# Patient Record
Sex: FEMALE | Race: WHITE | NOT HISPANIC OR LATINO
[De-identification: names, ages, dates, MRNs, and addresses within clinical notes are randomized per-mention and may not be internally consistent; named-entity substitution may affect disease eponyms.]

---

## 2017-01-31 ENCOUNTER — APPOINTMENT (OUTPATIENT)
Dept: GYNECOLOGIC ONCOLOGY | Facility: CLINIC | Age: 72
End: 2017-01-31

## 2017-01-31 VITALS
SYSTOLIC BLOOD PRESSURE: 122 MMHG | BODY MASS INDEX: 28.68 KG/M2 | WEIGHT: 168 LBS | DIASTOLIC BLOOD PRESSURE: 76 MMHG | HEIGHT: 64 IN

## 2017-04-26 ENCOUNTER — OUTPATIENT (OUTPATIENT)
Dept: OUTPATIENT SERVICES | Facility: HOSPITAL | Age: 72
LOS: 1 days | Discharge: HOME | End: 2017-04-26

## 2017-04-26 DIAGNOSIS — Z90.710 ACQUIRED ABSENCE OF BOTH CERVIX AND UTERUS: Chronic | ICD-10-CM

## 2017-04-26 DIAGNOSIS — N75.0 CYST OF BARTHOLIN'S GLAND: Chronic | ICD-10-CM

## 2017-04-26 DIAGNOSIS — Z98.89 OTHER SPECIFIED POSTPROCEDURAL STATES: Chronic | ICD-10-CM

## 2017-04-26 DIAGNOSIS — C57.00 MALIGNANT NEOPLASM OF UNSPECIFIED FALLOPIAN TUBE: ICD-10-CM

## 2017-04-30 ENCOUNTER — RESULT REVIEW (OUTPATIENT)
Age: 72
End: 2017-04-30

## 2017-04-30 ENCOUNTER — FORM ENCOUNTER (OUTPATIENT)
Age: 72
End: 2017-04-30

## 2017-05-01 ENCOUNTER — OUTPATIENT (OUTPATIENT)
Dept: OUTPATIENT SERVICES | Facility: HOSPITAL | Age: 72
LOS: 1 days | End: 2017-05-01
Payer: MEDICARE

## 2017-05-01 ENCOUNTER — APPOINTMENT (OUTPATIENT)
Dept: HEMATOLOGY ONCOLOGY | Facility: CLINIC | Age: 72
End: 2017-05-01

## 2017-05-01 ENCOUNTER — APPOINTMENT (OUTPATIENT)
Dept: RADIOLOGY | Facility: IMAGING CENTER | Age: 72
End: 2017-05-01

## 2017-05-01 VITALS
WEIGHT: 174.17 LBS | DIASTOLIC BLOOD PRESSURE: 76 MMHG | OXYGEN SATURATION: 97 % | HEART RATE: 70 BPM | BODY MASS INDEX: 29.9 KG/M2 | SYSTOLIC BLOOD PRESSURE: 110 MMHG | TEMPERATURE: 98.4 F | RESPIRATION RATE: 16 BRPM

## 2017-05-01 DIAGNOSIS — G62.0 DRUG-INDUCED POLYNEUROPATHY: ICD-10-CM

## 2017-05-01 DIAGNOSIS — Z98.89 OTHER SPECIFIED POSTPROCEDURAL STATES: Chronic | ICD-10-CM

## 2017-05-01 DIAGNOSIS — Z00.8 ENCOUNTER FOR OTHER GENERAL EXAMINATION: ICD-10-CM

## 2017-05-01 DIAGNOSIS — Z90.710 ACQUIRED ABSENCE OF BOTH CERVIX AND UTERUS: Chronic | ICD-10-CM

## 2017-05-01 DIAGNOSIS — N75.0 CYST OF BARTHOLIN'S GLAND: Chronic | ICD-10-CM

## 2017-05-01 PROCEDURE — 70330 X-RAY EXAM OF JAW JOINTS: CPT | Mod: 26

## 2017-05-01 PROCEDURE — 70330 X-RAY EXAM OF JAW JOINTS: CPT

## 2017-05-02 LAB
ALBUMIN SERPL ELPH-MCNC: 4.6 G/DL
ALP BLD-CCNC: 67 U/L
ALT SERPL-CCNC: 13 U/L
AST SERPL-CCNC: 13 U/L
BILIRUB SERPL-MCNC: 0.4 MG/DL
BUN SERPL-MCNC: 20 MG/DL
CALCIUM SERPL-MCNC: 10.3 MG/DL
CANCER AG125 SERPL-ACNC: 4 U/ML
CHLORIDE SERPL-SCNC: 101 MMOL/L
CO2 SERPL-SCNC: 28 MMOL/L
CREAT SERPL-MCNC: 1.19 MG/DL
GLUCOSE SERPL-MCNC: 81 MG/DL
POTASSIUM SERPL-SCNC: 4.7 MMOL/L
PROT SERPL-MCNC: 7.3 G/DL
SODIUM SERPL-SCNC: 144 MMOL/L

## 2017-05-12 ENCOUNTER — FORM ENCOUNTER (OUTPATIENT)
Age: 72
End: 2017-05-12

## 2017-05-13 ENCOUNTER — APPOINTMENT (OUTPATIENT)
Dept: CT IMAGING | Facility: IMAGING CENTER | Age: 72
End: 2017-05-13

## 2017-05-13 ENCOUNTER — OUTPATIENT (OUTPATIENT)
Dept: OUTPATIENT SERVICES | Facility: HOSPITAL | Age: 72
LOS: 1 days | End: 2017-05-13
Payer: MEDICARE

## 2017-05-13 DIAGNOSIS — Z98.89 OTHER SPECIFIED POSTPROCEDURAL STATES: Chronic | ICD-10-CM

## 2017-05-13 DIAGNOSIS — N75.0 CYST OF BARTHOLIN'S GLAND: Chronic | ICD-10-CM

## 2017-05-13 DIAGNOSIS — Z90.710 ACQUIRED ABSENCE OF BOTH CERVIX AND UTERUS: Chronic | ICD-10-CM

## 2017-05-13 DIAGNOSIS — C57.00 MALIGNANT NEOPLASM OF UNSPECIFIED FALLOPIAN TUBE: ICD-10-CM

## 2017-05-13 PROCEDURE — 71260 CT THORAX DX C+: CPT

## 2017-05-13 PROCEDURE — 82565 ASSAY OF CREATININE: CPT

## 2017-05-13 PROCEDURE — 74177 CT ABD & PELVIS W/CONTRAST: CPT

## 2017-07-25 ENCOUNTER — APPOINTMENT (OUTPATIENT)
Dept: GYNECOLOGIC ONCOLOGY | Facility: CLINIC | Age: 72
End: 2017-07-25
Payer: MEDICARE

## 2017-07-25 VITALS
HEIGHT: 64 IN | SYSTOLIC BLOOD PRESSURE: 110 MMHG | BODY MASS INDEX: 29.37 KG/M2 | DIASTOLIC BLOOD PRESSURE: 74 MMHG | WEIGHT: 172 LBS

## 2017-07-25 PROCEDURE — 99213 OFFICE O/P EST LOW 20 MIN: CPT

## 2017-07-25 RX ORDER — MOMETASONE FUROATE 1 MG/ML
0.1 SOLUTION TOPICAL
Qty: 30 | Refills: 0 | Status: COMPLETED | COMMUNITY
Start: 2017-04-03

## 2017-07-25 RX ORDER — NEOMYCIN AND POLYMYXIN B SULFATES AND HYDROCORTISONE OTIC 10; 3.5; 1 MG/ML; MG/ML; [USP'U]/ML
3.5-10000-1 SUSPENSION AURICULAR (OTIC)
Qty: 10 | Refills: 0 | Status: COMPLETED | COMMUNITY
Start: 2017-04-03

## 2017-07-31 ENCOUNTER — OUTPATIENT (OUTPATIENT)
Dept: OUTPATIENT SERVICES | Facility: HOSPITAL | Age: 72
LOS: 1 days | Discharge: HOME | End: 2017-07-31

## 2017-07-31 DIAGNOSIS — Z90.710 ACQUIRED ABSENCE OF BOTH CERVIX AND UTERUS: Chronic | ICD-10-CM

## 2017-07-31 DIAGNOSIS — N75.0 CYST OF BARTHOLIN'S GLAND: Chronic | ICD-10-CM

## 2017-07-31 DIAGNOSIS — C57.00 MALIGNANT NEOPLASM OF UNSPECIFIED FALLOPIAN TUBE: ICD-10-CM

## 2017-07-31 DIAGNOSIS — Z98.89 OTHER SPECIFIED POSTPROCEDURAL STATES: Chronic | ICD-10-CM

## 2017-08-04 ENCOUNTER — APPOINTMENT (OUTPATIENT)
Dept: HEMATOLOGY ONCOLOGY | Facility: CLINIC | Age: 72
End: 2017-08-04
Payer: MEDICARE

## 2017-08-04 ENCOUNTER — RESULT REVIEW (OUTPATIENT)
Age: 72
End: 2017-08-04

## 2017-08-04 VITALS
TEMPERATURE: 98.5 F | HEART RATE: 71 BPM | RESPIRATION RATE: 16 BRPM | OXYGEN SATURATION: 97 % | BODY MASS INDEX: 29.52 KG/M2 | SYSTOLIC BLOOD PRESSURE: 126 MMHG | WEIGHT: 172 LBS | DIASTOLIC BLOOD PRESSURE: 74 MMHG

## 2017-08-04 LAB
HCT VFR BLD CALC: 42.9 % — SIGNIFICANT CHANGE UP (ref 34.5–45)
HGB BLD-MCNC: 13.9 G/DL — SIGNIFICANT CHANGE UP (ref 11.5–15.5)
MCHC RBC-ENTMCNC: 30.8 PG — SIGNIFICANT CHANGE UP (ref 27–34)
MCHC RBC-ENTMCNC: 32.4 G/DL — SIGNIFICANT CHANGE UP (ref 32–36)
MCV RBC AUTO: 94.9 FL — SIGNIFICANT CHANGE UP (ref 80–100)
PLATELET # BLD AUTO: 220 K/UL — SIGNIFICANT CHANGE UP (ref 150–400)
RBC # BLD: 4.52 M/UL — SIGNIFICANT CHANGE UP (ref 3.8–5.2)
RBC # FLD: 13.2 % — SIGNIFICANT CHANGE UP (ref 10.3–14.5)
WBC # BLD: 6.5 K/UL — SIGNIFICANT CHANGE UP (ref 3.8–10.5)
WBC # FLD AUTO: 6.5 K/UL — SIGNIFICANT CHANGE UP (ref 3.8–10.5)

## 2017-08-04 PROCEDURE — 99214 OFFICE O/P EST MOD 30 MIN: CPT

## 2017-08-04 RX ORDER — DOXYCYCLINE HYCLATE 100 MG/1
100 CAPSULE ORAL
Qty: 20 | Refills: 0 | Status: DISCONTINUED | COMMUNITY
Start: 2017-07-11

## 2017-08-04 RX ORDER — LEVOTHYROXINE SODIUM 75 UG/1
75 TABLET ORAL
Refills: 0 | Status: ACTIVE | COMMUNITY

## 2017-08-04 RX ORDER — 1.1% SODIUM FLUORIDE PRESCRIPTION DENTAL CREAM 5 MG/G
1.1 CREAM DENTAL
Qty: 51 | Refills: 0 | Status: DISCONTINUED | COMMUNITY
Start: 2017-07-13

## 2017-08-05 LAB
ALBUMIN SERPL ELPH-MCNC: 4.6 G/DL
ALP BLD-CCNC: 67 U/L
ALT SERPL-CCNC: 13 U/L
ANION GAP SERPL CALC-SCNC: 16 MMOL/L
AST SERPL-CCNC: 15 U/L
BILIRUB SERPL-MCNC: 0.3 MG/DL
BUN SERPL-MCNC: 19 MG/DL
CALCIUM SERPL-MCNC: 10.4 MG/DL
CANCER AG125 SERPL-ACNC: 5 U/ML
CHLORIDE SERPL-SCNC: 102 MMOL/L
CO2 SERPL-SCNC: 27 MMOL/L
CREAT SERPL-MCNC: 1.15 MG/DL
GLUCOSE SERPL-MCNC: 95 MG/DL
POTASSIUM SERPL-SCNC: 4.7 MMOL/L
PROT SERPL-MCNC: 7.3 G/DL
SODIUM SERPL-SCNC: 145 MMOL/L

## 2017-11-17 ENCOUNTER — FORM ENCOUNTER (OUTPATIENT)
Age: 72
End: 2017-11-17

## 2017-11-18 ENCOUNTER — OUTPATIENT (OUTPATIENT)
Dept: OUTPATIENT SERVICES | Facility: HOSPITAL | Age: 72
LOS: 1 days | End: 2017-11-18
Payer: MEDICARE

## 2017-11-18 ENCOUNTER — APPOINTMENT (OUTPATIENT)
Dept: CT IMAGING | Facility: IMAGING CENTER | Age: 72
End: 2017-11-18
Payer: MEDICARE

## 2017-11-18 DIAGNOSIS — N75.0 CYST OF BARTHOLIN'S GLAND: Chronic | ICD-10-CM

## 2017-11-18 DIAGNOSIS — Z90.710 ACQUIRED ABSENCE OF BOTH CERVIX AND UTERUS: Chronic | ICD-10-CM

## 2017-11-18 DIAGNOSIS — Z98.89 OTHER SPECIFIED POSTPROCEDURAL STATES: Chronic | ICD-10-CM

## 2017-11-18 DIAGNOSIS — Z00.8 ENCOUNTER FOR OTHER GENERAL EXAMINATION: ICD-10-CM

## 2017-11-18 PROCEDURE — 71260 CT THORAX DX C+: CPT

## 2017-11-18 PROCEDURE — 74177 CT ABD & PELVIS W/CONTRAST: CPT

## 2017-11-18 PROCEDURE — 71260 CT THORAX DX C+: CPT | Mod: 26

## 2017-11-18 PROCEDURE — 74177 CT ABD & PELVIS W/CONTRAST: CPT | Mod: 26

## 2017-11-18 PROCEDURE — 82565 ASSAY OF CREATININE: CPT

## 2018-03-02 ENCOUNTER — OUTPATIENT (OUTPATIENT)
Dept: OUTPATIENT SERVICES | Facility: HOSPITAL | Age: 73
LOS: 1 days | Discharge: HOME | End: 2018-03-02

## 2018-03-02 DIAGNOSIS — C57.00 MALIGNANT NEOPLASM OF UNSPECIFIED FALLOPIAN TUBE: ICD-10-CM

## 2018-03-02 DIAGNOSIS — N75.0 CYST OF BARTHOLIN'S GLAND: Chronic | ICD-10-CM

## 2018-03-02 DIAGNOSIS — Z90.710 ACQUIRED ABSENCE OF BOTH CERVIX AND UTERUS: Chronic | ICD-10-CM

## 2018-03-02 DIAGNOSIS — Z98.89 OTHER SPECIFIED POSTPROCEDURAL STATES: Chronic | ICD-10-CM

## 2018-03-09 ENCOUNTER — APPOINTMENT (OUTPATIENT)
Dept: HEMATOLOGY ONCOLOGY | Facility: CLINIC | Age: 73
End: 2018-03-09
Payer: MEDICARE

## 2018-03-09 ENCOUNTER — RESULT REVIEW (OUTPATIENT)
Age: 73
End: 2018-03-09

## 2018-03-09 VITALS
WEIGHT: 174.81 LBS | OXYGEN SATURATION: 96 % | SYSTOLIC BLOOD PRESSURE: 107 MMHG | TEMPERATURE: 98.2 F | DIASTOLIC BLOOD PRESSURE: 66 MMHG | BODY MASS INDEX: 30.01 KG/M2 | HEART RATE: 65 BPM | RESPIRATION RATE: 16 BRPM

## 2018-03-09 LAB
HCT VFR BLD CALC: 41.9 % — SIGNIFICANT CHANGE UP (ref 34.5–45)
HGB BLD-MCNC: 14.3 G/DL — SIGNIFICANT CHANGE UP (ref 11.5–15.5)
MCHC RBC-ENTMCNC: 32.3 PG — SIGNIFICANT CHANGE UP (ref 27–34)
MCHC RBC-ENTMCNC: 34.2 G/DL — SIGNIFICANT CHANGE UP (ref 32–36)
MCV RBC AUTO: 94.4 FL — SIGNIFICANT CHANGE UP (ref 80–100)
PLATELET # BLD AUTO: 204 K/UL — SIGNIFICANT CHANGE UP (ref 150–400)
RBC # BLD: 4.44 M/UL — SIGNIFICANT CHANGE UP (ref 3.8–5.2)
RBC # FLD: 11.5 % — SIGNIFICANT CHANGE UP (ref 10.3–14.5)
WBC # BLD: 5.5 K/UL — SIGNIFICANT CHANGE UP (ref 3.8–10.5)
WBC # FLD AUTO: 5.5 K/UL — SIGNIFICANT CHANGE UP (ref 3.8–10.5)

## 2018-03-09 PROCEDURE — 99214 OFFICE O/P EST MOD 30 MIN: CPT

## 2018-03-11 LAB
ALBUMIN SERPL ELPH-MCNC: 4.4 G/DL
ALP BLD-CCNC: 69 U/L
ALT SERPL-CCNC: 13 U/L
ANION GAP SERPL CALC-SCNC: 16 MMOL/L
AST SERPL-CCNC: 16 U/L
BILIRUB SERPL-MCNC: 0.3 MG/DL
BUN SERPL-MCNC: 22 MG/DL
CALCIUM SERPL-MCNC: 10.1 MG/DL
CANCER AG125 SERPL-ACNC: 4 U/ML
CHLORIDE SERPL-SCNC: 102 MMOL/L
CO2 SERPL-SCNC: 24 MMOL/L
CREAT SERPL-MCNC: 1.18 MG/DL
GLUCOSE SERPL-MCNC: 84 MG/DL
POTASSIUM SERPL-SCNC: 4.9 MMOL/L
PROT SERPL-MCNC: 7 G/DL
SODIUM SERPL-SCNC: 142 MMOL/L

## 2018-04-20 ENCOUNTER — FORM ENCOUNTER (OUTPATIENT)
Age: 73
End: 2018-04-20

## 2018-04-21 ENCOUNTER — APPOINTMENT (OUTPATIENT)
Dept: CT IMAGING | Facility: IMAGING CENTER | Age: 73
End: 2018-04-21
Payer: MEDICARE

## 2018-04-21 ENCOUNTER — OUTPATIENT (OUTPATIENT)
Dept: OUTPATIENT SERVICES | Facility: HOSPITAL | Age: 73
LOS: 1 days | End: 2018-04-21
Payer: MEDICARE

## 2018-04-21 DIAGNOSIS — Z98.89 OTHER SPECIFIED POSTPROCEDURAL STATES: Chronic | ICD-10-CM

## 2018-04-21 DIAGNOSIS — Z90.710 ACQUIRED ABSENCE OF BOTH CERVIX AND UTERUS: Chronic | ICD-10-CM

## 2018-04-21 DIAGNOSIS — N75.0 CYST OF BARTHOLIN'S GLAND: Chronic | ICD-10-CM

## 2018-04-21 DIAGNOSIS — C57.00 MALIGNANT NEOPLASM OF UNSPECIFIED FALLOPIAN TUBE: ICD-10-CM

## 2018-04-21 PROCEDURE — 74177 CT ABD & PELVIS W/CONTRAST: CPT

## 2018-04-21 PROCEDURE — 82565 ASSAY OF CREATININE: CPT

## 2018-04-21 PROCEDURE — 74177 CT ABD & PELVIS W/CONTRAST: CPT | Mod: 26

## 2018-04-21 PROCEDURE — 71260 CT THORAX DX C+: CPT | Mod: 26

## 2018-04-21 PROCEDURE — 71260 CT THORAX DX C+: CPT

## 2018-05-08 ENCOUNTER — APPOINTMENT (OUTPATIENT)
Dept: GYNECOLOGIC ONCOLOGY | Facility: CLINIC | Age: 73
End: 2018-05-08
Payer: MEDICARE

## 2018-05-08 VITALS
DIASTOLIC BLOOD PRESSURE: 72 MMHG | HEIGHT: 64 IN | WEIGHT: 175 LBS | SYSTOLIC BLOOD PRESSURE: 110 MMHG | BODY MASS INDEX: 29.88 KG/M2

## 2018-05-08 DIAGNOSIS — Z87.898 PERSONAL HISTORY OF OTHER SPECIFIED CONDITIONS: ICD-10-CM

## 2018-05-08 PROCEDURE — 99214 OFFICE O/P EST MOD 30 MIN: CPT

## 2018-05-08 RX ORDER — MONTELUKAST 10 MG/1
TABLET, FILM COATED ORAL
Refills: 0 | Status: ACTIVE | COMMUNITY

## 2018-05-08 RX ORDER — MUPIROCIN 20 MG/G
2 OINTMENT TOPICAL
Qty: 22 | Refills: 0 | Status: DISCONTINUED | COMMUNITY
Start: 2017-08-01 | End: 2018-05-08

## 2018-05-08 RX ORDER — AZELASTINE HYDROCHLORIDE 137 UG/1
0.1 SPRAY, METERED NASAL
Qty: 30 | Refills: 0 | Status: DISCONTINUED | COMMUNITY
Start: 2017-06-02 | End: 2018-05-08

## 2018-09-20 ENCOUNTER — OUTPATIENT (OUTPATIENT)
Dept: OUTPATIENT SERVICES | Facility: HOSPITAL | Age: 73
LOS: 1 days | Discharge: HOME | End: 2018-09-20

## 2018-09-20 DIAGNOSIS — Z98.89 OTHER SPECIFIED POSTPROCEDURAL STATES: Chronic | ICD-10-CM

## 2018-09-20 DIAGNOSIS — N75.0 CYST OF BARTHOLIN'S GLAND: Chronic | ICD-10-CM

## 2018-09-20 DIAGNOSIS — Z90.710 ACQUIRED ABSENCE OF BOTH CERVIX AND UTERUS: Chronic | ICD-10-CM

## 2018-09-20 DIAGNOSIS — C57.00 MALIGNANT NEOPLASM OF UNSPECIFIED FALLOPIAN TUBE: ICD-10-CM

## 2018-09-28 ENCOUNTER — APPOINTMENT (OUTPATIENT)
Dept: HEMATOLOGY ONCOLOGY | Facility: CLINIC | Age: 73
End: 2018-09-28
Payer: MEDICARE

## 2018-09-28 ENCOUNTER — RESULT REVIEW (OUTPATIENT)
Age: 73
End: 2018-09-28

## 2018-09-28 VITALS
WEIGHT: 176 LBS | OXYGEN SATURATION: 96 % | HEART RATE: 77 BPM | TEMPERATURE: 98.1 F | DIASTOLIC BLOOD PRESSURE: 78 MMHG | RESPIRATION RATE: 16 BRPM | BODY MASS INDEX: 30.21 KG/M2 | SYSTOLIC BLOOD PRESSURE: 126 MMHG

## 2018-09-28 LAB
HCT VFR BLD CALC: 42.5 % — SIGNIFICANT CHANGE UP (ref 34.5–45)
HGB BLD-MCNC: 14.6 G/DL — SIGNIFICANT CHANGE UP (ref 11.5–15.5)
MCHC RBC-ENTMCNC: 32.6 PG — SIGNIFICANT CHANGE UP (ref 27–34)
MCHC RBC-ENTMCNC: 34.2 G/DL — SIGNIFICANT CHANGE UP (ref 32–36)
MCV RBC AUTO: 95.1 FL — SIGNIFICANT CHANGE UP (ref 80–100)
PLATELET # BLD AUTO: 229 K/UL — SIGNIFICANT CHANGE UP (ref 150–400)
RBC # BLD: 4.47 M/UL — SIGNIFICANT CHANGE UP (ref 3.8–5.2)
RBC # FLD: 11.7 % — SIGNIFICANT CHANGE UP (ref 10.3–14.5)
WBC # BLD: 5.4 K/UL — SIGNIFICANT CHANGE UP (ref 3.8–10.5)
WBC # FLD AUTO: 5.4 K/UL — SIGNIFICANT CHANGE UP (ref 3.8–10.5)

## 2018-09-28 PROCEDURE — 99214 OFFICE O/P EST MOD 30 MIN: CPT

## 2018-09-29 LAB — CANCER AG125 SERPL-ACNC: 6 U/ML

## 2018-10-19 ENCOUNTER — FORM ENCOUNTER (OUTPATIENT)
Age: 73
End: 2018-10-19

## 2018-10-20 ENCOUNTER — APPOINTMENT (OUTPATIENT)
Dept: CT IMAGING | Facility: IMAGING CENTER | Age: 73
End: 2018-10-20
Payer: MEDICARE

## 2018-10-20 ENCOUNTER — OUTPATIENT (OUTPATIENT)
Dept: OUTPATIENT SERVICES | Facility: HOSPITAL | Age: 73
LOS: 1 days | End: 2018-10-20
Payer: MEDICARE

## 2018-10-20 DIAGNOSIS — Z98.89 OTHER SPECIFIED POSTPROCEDURAL STATES: Chronic | ICD-10-CM

## 2018-10-20 DIAGNOSIS — Z90.710 ACQUIRED ABSENCE OF BOTH CERVIX AND UTERUS: Chronic | ICD-10-CM

## 2018-10-20 DIAGNOSIS — Z00.8 ENCOUNTER FOR OTHER GENERAL EXAMINATION: ICD-10-CM

## 2018-10-20 DIAGNOSIS — N75.0 CYST OF BARTHOLIN'S GLAND: Chronic | ICD-10-CM

## 2018-10-20 PROCEDURE — 71260 CT THORAX DX C+: CPT | Mod: 26

## 2018-10-20 PROCEDURE — 74177 CT ABD & PELVIS W/CONTRAST: CPT

## 2018-10-20 PROCEDURE — 74177 CT ABD & PELVIS W/CONTRAST: CPT | Mod: 26

## 2018-10-20 PROCEDURE — 71260 CT THORAX DX C+: CPT

## 2018-10-20 PROCEDURE — 82565 ASSAY OF CREATININE: CPT

## 2018-11-27 ENCOUNTER — APPOINTMENT (OUTPATIENT)
Dept: GYNECOLOGIC ONCOLOGY | Facility: CLINIC | Age: 73
End: 2018-11-27
Payer: MEDICARE

## 2018-11-27 VITALS — WEIGHT: 174 LBS | DIASTOLIC BLOOD PRESSURE: 80 MMHG | SYSTOLIC BLOOD PRESSURE: 130 MMHG | BODY MASS INDEX: 29.87 KG/M2

## 2018-11-27 PROCEDURE — 99213 OFFICE O/P EST LOW 20 MIN: CPT

## 2019-04-23 ENCOUNTER — OUTPATIENT (OUTPATIENT)
Dept: OUTPATIENT SERVICES | Facility: HOSPITAL | Age: 74
LOS: 1 days | Discharge: HOME | End: 2019-04-23

## 2019-04-23 DIAGNOSIS — Z98.89 OTHER SPECIFIED POSTPROCEDURAL STATES: Chronic | ICD-10-CM

## 2019-04-23 DIAGNOSIS — C57.00 MALIGNANT NEOPLASM OF UNSPECIFIED FALLOPIAN TUBE: ICD-10-CM

## 2019-04-23 DIAGNOSIS — Z90.710 ACQUIRED ABSENCE OF BOTH CERVIX AND UTERUS: Chronic | ICD-10-CM

## 2019-04-23 DIAGNOSIS — N75.0 CYST OF BARTHOLIN'S GLAND: Chronic | ICD-10-CM

## 2019-04-26 ENCOUNTER — RESULT REVIEW (OUTPATIENT)
Age: 74
End: 2019-04-26

## 2019-04-26 ENCOUNTER — APPOINTMENT (OUTPATIENT)
Dept: HEMATOLOGY ONCOLOGY | Facility: CLINIC | Age: 74
End: 2019-04-26
Payer: MEDICARE

## 2019-04-26 VITALS
BODY MASS INDEX: 29.97 KG/M2 | TEMPERATURE: 98.1 F | OXYGEN SATURATION: 96 % | WEIGHT: 174.6 LBS | DIASTOLIC BLOOD PRESSURE: 84 MMHG | SYSTOLIC BLOOD PRESSURE: 133 MMHG | HEART RATE: 67 BPM | RESPIRATION RATE: 16 BRPM

## 2019-04-26 DIAGNOSIS — R06.02 SHORTNESS OF BREATH: ICD-10-CM

## 2019-04-26 LAB
HCT VFR BLD CALC: 43.1 % — SIGNIFICANT CHANGE UP (ref 34.5–45)
HGB BLD-MCNC: 14.4 G/DL — SIGNIFICANT CHANGE UP (ref 11.5–15.5)
MCHC RBC-ENTMCNC: 31.7 PG — SIGNIFICANT CHANGE UP (ref 27–34)
MCHC RBC-ENTMCNC: 33.5 G/DL — SIGNIFICANT CHANGE UP (ref 32–36)
MCV RBC AUTO: 94.6 FL — SIGNIFICANT CHANGE UP (ref 80–100)
PLATELET # BLD AUTO: 251 K/UL — SIGNIFICANT CHANGE UP (ref 150–400)
RBC # BLD: 4.56 M/UL — SIGNIFICANT CHANGE UP (ref 3.8–5.2)
RBC # FLD: 11.7 % — SIGNIFICANT CHANGE UP (ref 10.3–14.5)
WBC # BLD: 6.4 K/UL — SIGNIFICANT CHANGE UP (ref 3.8–10.5)
WBC # FLD AUTO: 6.4 K/UL — SIGNIFICANT CHANGE UP (ref 3.8–10.5)

## 2019-04-26 PROCEDURE — 99214 OFFICE O/P EST MOD 30 MIN: CPT

## 2019-04-26 RX ORDER — OMEPRAZOLE 40 MG/1
CAPSULE, DELAYED RELEASE ORAL
Refills: 0 | Status: DISCONTINUED | COMMUNITY
End: 2019-04-26

## 2019-04-28 NOTE — PHYSICAL EXAM
[Fully active, able to carry on all pre-disease performance without restriction] : Status 0 - Fully active, able to carry on all pre-disease performance without restriction [Normal] : affect appropriate [de-identified] : opens and closes jaw without click or pain  [de-identified] : +BS, + tenderness on palpation around midline surgical scar; no rebound or guarding [de-identified] : gait steady

## 2019-04-28 NOTE — REASON FOR VISIT
[Follow-Up Visit] : a follow-up [Spouse] : spouse [FreeTextEntry2] : follow up for fallopian tube carcinoma

## 2019-04-28 NOTE — REVIEW OF SYSTEMS
[Negative] : Allergic/Immunologic [Shortness Of Breath] : shortness of breath [Wheezing] : no wheezing [Cough] : cough [SOB on Exertion] : no shortness of breath during exertion

## 2019-04-28 NOTE — CONSULT LETTER
[Dear  ___] : Dear  [unfilled], [Courtesy Letter:] : I had the pleasure of seeing your patient, [unfilled], in my office today. [Please see my note below.] : Please see my note below. [Consult Closing:] : Thank you very much for allowing me to participate in the care of this patient.  If you have any questions, please do not hesitate to contact me. [Sincerely,] : Sincerely, [FreeTextEntry2] : Karel Santana MD\par [id number] [place] [address] [address]\[place] [address] [FreeTextEntry3] : Andrzej Mortensen MD\par Attending\par [facility]\par

## 2019-04-28 NOTE — HISTORY OF PRESENT ILLNESS
[Disease: _____________________] : Disease: [unfilled] [T: ___] : T[unfilled] [AJCC Stage: ____] : AJCC Stage: [unfilled] [N: ___] : N[unfilled] [de-identified] : She had evaluation for abdominal pain with CT of the abdomen/ pelvis on 7/20/15 which showed large cystic mass in the pelvis measuring 18.3 x 16.1 x 12 cm. There was no lymphadenopathy noted. On 8/11/15, she had exploratory laparotomy with ZAY/ BSO, omentectomy, and lymph node dissection. 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 She started her adjuvant carboplatin/ paclitaxel on 9/28/15. She had numbness and pain from the paclitaxel that persisted despite dose adjustment for cycle 2. We switched to gemcitabine due to persistent numbness and pain. Her treatment was delayed by 1 week after C3 with ANC still 0.9 K/microL 3 weeks after last treatment. Last treatment on 1/19/16. CT from 2/2016 and 5/2016 without any new evidence of disease.  [de-identified] : serous carcinoma [de-identified] : carbo/ taxol every 3 weeks from 9/28/15 to 10/2015\par carbo/ gem D 1 every 3 weeks from 10/2015 to 1/19/16\par Genetic testing: pt refused  [de-identified] : Pt reports SOB over the past three months: occasional cough; nonproductive; no wheezing. \par She went to see her PCP and she had cardiac workup- Stress Test, Echocardiogram \par She has long term hx of Asthma and continues to take Singulair \par She denies any abdominal pain or bloat \par She is eating and drinking well \par Moving her bowels 2-3 X daily \par She denies any new medications. \par She went to see PCP and had labs performed.

## 2019-04-28 NOTE — ASSESSMENT
[FreeTextEntry1] : She is a 73 y/o F with STage II fallopian tube serous carcinoma who had exploratory laparotomy with ZAY/ BSO, omentectomy, and LN dissection on 8/11/15. She completed 6 cycles of adjuvant chemotherapy in 1/19/16. She has no new abdominal symptoms but with SOB with negative cardiac work up. We reviewed follow up with pulmonary if CT of the chest negative. We reviewed continued use of allergy medications. Will obtain CT of the chest/ abd/ pelvis to evaluate for disease and obtain . If negative, next follow up in 6 months.

## 2019-05-03 LAB — CANCER AG125 SERPL-ACNC: 6 U/ML

## 2019-05-07 ENCOUNTER — APPOINTMENT (OUTPATIENT)
Dept: GYNECOLOGIC ONCOLOGY | Facility: CLINIC | Age: 74
End: 2019-05-07
Payer: MEDICARE

## 2019-05-07 VITALS
BODY MASS INDEX: 29.02 KG/M2 | HEIGHT: 64 IN | WEIGHT: 170 LBS | DIASTOLIC BLOOD PRESSURE: 73 MMHG | SYSTOLIC BLOOD PRESSURE: 120 MMHG

## 2019-05-07 PROCEDURE — 99213 OFFICE O/P EST LOW 20 MIN: CPT

## 2019-05-07 NOTE — HISTORY OF PRESENT ILLNESS
[Disease: _____________________] : Disease: [unfilled] [T: ___] : T[unfilled] [AJCC Stage: ____] : AJCC Stage: [unfilled] [N: ___] : N[unfilled] [FreeTextEntry1] : Stage IIA fallopian tube cancer. \par ELAP, ZAY, BSO and staging 8/11/15.\par \par Received chemotherapy with Dr. Mortensen completed in January 2016.  2 cycles of Carbo and Taxol, then changed to Gemzar due to persistent neuropathy despite dose reduction. \par \par Complains of lower pelvic abdominal pain that has been present on and off since completion of chemotherapy. Denies bleeding. \par \par Imaging in October 2018 was GREG. \par  remains normal, 6\par Mammography October 2018 BIRADS 2. \par \par Never had genetic testing. Has been discussed and recommended but patient has declined. \par FH of breast cancer in a sister.  [de-identified] : serous carcinoma [de-identified] : carbo/ taxol every 3 weeks from 9/28/15 to 10/2015\par carbo/ gem D 1 every 3 weeks from 10/2015 to 1/19/16

## 2019-05-07 NOTE — PHYSICAL EXAM
[Absent] : Adnexa(ae): Absent [Fully active, able to carry on all pre-disease performance without restriction] : Status 0 - Fully active, able to carry on all pre-disease performance without restriction [Normal] : Breasts: Normal [de-identified] : well healed vertical midline incision

## 2019-05-10 ENCOUNTER — FORM ENCOUNTER (OUTPATIENT)
Age: 74
End: 2019-05-10

## 2019-05-11 ENCOUNTER — APPOINTMENT (OUTPATIENT)
Dept: CT IMAGING | Facility: IMAGING CENTER | Age: 74
End: 2019-05-11
Payer: MEDICARE

## 2019-05-11 ENCOUNTER — OUTPATIENT (OUTPATIENT)
Dept: OUTPATIENT SERVICES | Facility: HOSPITAL | Age: 74
LOS: 1 days | End: 2019-05-11
Payer: MEDICARE

## 2019-05-11 DIAGNOSIS — Z98.89 OTHER SPECIFIED POSTPROCEDURAL STATES: Chronic | ICD-10-CM

## 2019-05-11 DIAGNOSIS — C57.00 MALIGNANT NEOPLASM OF UNSPECIFIED FALLOPIAN TUBE: ICD-10-CM

## 2019-05-11 DIAGNOSIS — N75.0 CYST OF BARTHOLIN'S GLAND: Chronic | ICD-10-CM

## 2019-05-11 DIAGNOSIS — Z90.710 ACQUIRED ABSENCE OF BOTH CERVIX AND UTERUS: Chronic | ICD-10-CM

## 2019-05-11 PROCEDURE — 71260 CT THORAX DX C+: CPT | Mod: 26

## 2019-05-11 PROCEDURE — 71260 CT THORAX DX C+: CPT

## 2019-05-11 PROCEDURE — 74177 CT ABD & PELVIS W/CONTRAST: CPT

## 2019-05-11 PROCEDURE — 74177 CT ABD & PELVIS W/CONTRAST: CPT | Mod: 26

## 2019-10-07 ENCOUNTER — OUTPATIENT (OUTPATIENT)
Dept: OUTPATIENT SERVICES | Facility: HOSPITAL | Age: 74
LOS: 1 days | Discharge: HOME | End: 2019-10-07

## 2019-10-07 DIAGNOSIS — Z98.89 OTHER SPECIFIED POSTPROCEDURAL STATES: Chronic | ICD-10-CM

## 2019-10-07 DIAGNOSIS — N75.0 CYST OF BARTHOLIN'S GLAND: Chronic | ICD-10-CM

## 2019-10-07 DIAGNOSIS — Z90.710 ACQUIRED ABSENCE OF BOTH CERVIX AND UTERUS: Chronic | ICD-10-CM

## 2019-10-07 DIAGNOSIS — C57.00 MALIGNANT NEOPLASM OF UNSPECIFIED FALLOPIAN TUBE: ICD-10-CM

## 2019-10-08 ENCOUNTER — FORM ENCOUNTER (OUTPATIENT)
Age: 74
End: 2019-10-08

## 2019-10-09 ENCOUNTER — APPOINTMENT (OUTPATIENT)
Dept: HEMATOLOGY ONCOLOGY | Facility: CLINIC | Age: 74
End: 2019-10-09
Payer: MEDICARE

## 2019-10-09 ENCOUNTER — OUTPATIENT (OUTPATIENT)
Dept: OUTPATIENT SERVICES | Facility: HOSPITAL | Age: 74
LOS: 1 days | End: 2019-10-09
Payer: MEDICARE

## 2019-10-09 ENCOUNTER — LABORATORY RESULT (OUTPATIENT)
Age: 74
End: 2019-10-09

## 2019-10-09 ENCOUNTER — RESULT REVIEW (OUTPATIENT)
Age: 74
End: 2019-10-09

## 2019-10-09 ENCOUNTER — APPOINTMENT (OUTPATIENT)
Dept: CT IMAGING | Facility: IMAGING CENTER | Age: 74
End: 2019-10-09
Payer: MEDICARE

## 2019-10-09 VITALS
WEIGHT: 170.99 LBS | DIASTOLIC BLOOD PRESSURE: 82 MMHG | SYSTOLIC BLOOD PRESSURE: 133 MMHG | HEART RATE: 74 BPM | RESPIRATION RATE: 16 BRPM | TEMPERATURE: 98.2 F | OXYGEN SATURATION: 94 % | BODY MASS INDEX: 29.35 KG/M2

## 2019-10-09 DIAGNOSIS — Z98.89 OTHER SPECIFIED POSTPROCEDURAL STATES: Chronic | ICD-10-CM

## 2019-10-09 DIAGNOSIS — Z90.710 ACQUIRED ABSENCE OF BOTH CERVIX AND UTERUS: Chronic | ICD-10-CM

## 2019-10-09 DIAGNOSIS — R11.0 NAUSEA: ICD-10-CM

## 2019-10-09 DIAGNOSIS — N75.0 CYST OF BARTHOLIN'S GLAND: Chronic | ICD-10-CM

## 2019-10-09 LAB
HCT VFR BLD CALC: 47 % — HIGH (ref 34.5–45)
HGB BLD-MCNC: 15.2 G/DL — SIGNIFICANT CHANGE UP (ref 11.5–15.5)
MCHC RBC-ENTMCNC: 31.9 PG — SIGNIFICANT CHANGE UP (ref 27–34)
MCHC RBC-ENTMCNC: 32.4 G/DL — SIGNIFICANT CHANGE UP (ref 32–36)
MCV RBC AUTO: 98.3 FL — SIGNIFICANT CHANGE UP (ref 80–100)
PLATELET # BLD AUTO: 232 K/UL — SIGNIFICANT CHANGE UP (ref 150–400)
RBC # BLD: 4.78 M/UL — SIGNIFICANT CHANGE UP (ref 3.8–5.2)
RBC # FLD: 12.6 % — SIGNIFICANT CHANGE UP (ref 10.3–14.5)
WBC # BLD: 6.1 K/UL — SIGNIFICANT CHANGE UP (ref 3.8–10.5)
WBC # FLD AUTO: 6.1 K/UL — SIGNIFICANT CHANGE UP (ref 3.8–10.5)

## 2019-10-09 PROCEDURE — 99214 OFFICE O/P EST MOD 30 MIN: CPT

## 2019-10-09 PROCEDURE — 82565 ASSAY OF CREATININE: CPT

## 2019-10-09 PROCEDURE — 74177 CT ABD & PELVIS W/CONTRAST: CPT | Mod: 26

## 2019-10-09 PROCEDURE — 74177 CT ABD & PELVIS W/CONTRAST: CPT

## 2019-10-10 NOTE — ASSESSMENT
[FreeTextEntry1] : She is a 75 y/o F with STage II fallopian tube serous carcinoma who had exploratory laparotomy with ZAY/ BSO, omentectomy, and LN dissection on 8/11/15. She completed 6 cycles of adjuvant chemotherapy in 1/19/16. She has nausea which may be attributed to GERD. We reviewed Maalox or famotidine. We reviewed  and CT imaging to rule out disease. If negative and nausea still present despite antacids, we reviewed GI evaluation. She will continue with surveillance: next follow up in 6 months.

## 2019-10-10 NOTE — REVIEW OF SYSTEMS
[Vomiting] : no vomiting [Abdominal Pain] : no abdominal pain [Diarrhea] : no diarrhea [Constipation] : no constipation [FreeTextEntry7] : nausea  [Negative] : Heme/Lymph

## 2019-10-10 NOTE — PHYSICAL EXAM
[Fully active, able to carry on all pre-disease performance without restriction] : Status 0 - Fully active, able to carry on all pre-disease performance without restriction [Normal] : affect appropriate [de-identified] : +BS, + tenderness on palpation around midline surgical scar; no rebound or guarding [de-identified] : gait steady

## 2019-10-10 NOTE — HISTORY OF PRESENT ILLNESS
[Disease: _____________________] : Disease: [unfilled] [N: ___] : N[unfilled] [T: ___] : T[unfilled] [AJCC Stage: ____] : AJCC Stage: [unfilled] [de-identified] : serous carcinoma [de-identified] : She had evaluation for abdominal pain with CT of the abdomen/ pelvis on 7/20/15 which showed large cystic mass in the pelvis measuring 18.3 x 16.1 x 12 cm. There was no lymphadenopathy noted. On 8/11/15, she had exploratory laparotomy with ZAY/ BSO, omentectomy, and lymph node dissection. 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 She started her adjuvant carboplatin/ paclitaxel on 9/28/15. She had numbness and pain from the paclitaxel that persisted despite dose adjustment for cycle 2. We switched to gemcitabine due to persistent numbness and pain. Her treatment was delayed by 1 week after C3 with ANC still 0.9 K/microL 3 weeks after last treatment. Last treatment on 1/19/16. CT from 2/2016 and 5/2016 without any new evidence of disease.  [de-identified] : carbo/ taxol every 3 weeks from 9/28/15 to 10/2015\par carbo/ gem D 1 every 3 weeks from 10/2015 to 1/19/16\par Genetic testing: pt refused  [de-identified] : Has noted nausea after eating. Has not equated to increased reflux or tried any antacids. She denies any abdominal pain or vomiting. Her BM have been regular. She denies any dizziness or sinus issues or headaches. She had blood work done by PCP which was WNL few months ago.

## 2020-05-13 ENCOUNTER — OUTPATIENT (OUTPATIENT)
Dept: OUTPATIENT SERVICES | Facility: HOSPITAL | Age: 75
LOS: 1 days | Discharge: HOME | End: 2020-05-13

## 2020-05-13 DIAGNOSIS — N75.0 CYST OF BARTHOLIN'S GLAND: Chronic | ICD-10-CM

## 2020-05-13 DIAGNOSIS — C57.00 MALIGNANT NEOPLASM OF UNSPECIFIED FALLOPIAN TUBE: ICD-10-CM

## 2020-05-13 DIAGNOSIS — Z90.710 ACQUIRED ABSENCE OF BOTH CERVIX AND UTERUS: Chronic | ICD-10-CM

## 2020-05-13 DIAGNOSIS — Z98.89 OTHER SPECIFIED POSTPROCEDURAL STATES: Chronic | ICD-10-CM

## 2020-05-18 ENCOUNTER — APPOINTMENT (OUTPATIENT)
Dept: HEMATOLOGY ONCOLOGY | Facility: CLINIC | Age: 75
End: 2020-05-18

## 2020-05-26 ENCOUNTER — APPOINTMENT (OUTPATIENT)
Dept: GYNECOLOGIC ONCOLOGY | Facility: CLINIC | Age: 75
End: 2020-05-26

## 2020-06-09 ENCOUNTER — OUTPATIENT (OUTPATIENT)
Dept: OUTPATIENT SERVICES | Facility: HOSPITAL | Age: 75
LOS: 1 days | Discharge: HOME | End: 2020-06-09

## 2020-06-09 DIAGNOSIS — Z98.89 OTHER SPECIFIED POSTPROCEDURAL STATES: Chronic | ICD-10-CM

## 2020-06-09 DIAGNOSIS — C57.00 MALIGNANT NEOPLASM OF UNSPECIFIED FALLOPIAN TUBE: ICD-10-CM

## 2020-06-09 DIAGNOSIS — Z90.710 ACQUIRED ABSENCE OF BOTH CERVIX AND UTERUS: Chronic | ICD-10-CM

## 2020-06-09 DIAGNOSIS — N75.0 CYST OF BARTHOLIN'S GLAND: Chronic | ICD-10-CM

## 2020-06-15 ENCOUNTER — APPOINTMENT (OUTPATIENT)
Dept: HEMATOLOGY ONCOLOGY | Facility: CLINIC | Age: 75
End: 2020-06-15
Payer: MEDICARE

## 2020-06-15 ENCOUNTER — APPOINTMENT (OUTPATIENT)
Dept: CT IMAGING | Facility: IMAGING CENTER | Age: 75
End: 2020-06-15
Payer: MEDICARE

## 2020-06-15 ENCOUNTER — RESULT REVIEW (OUTPATIENT)
Age: 75
End: 2020-06-15

## 2020-06-15 ENCOUNTER — OUTPATIENT (OUTPATIENT)
Dept: OUTPATIENT SERVICES | Facility: HOSPITAL | Age: 75
LOS: 1 days | End: 2020-06-15
Payer: MEDICARE

## 2020-06-15 VITALS
WEIGHT: 167.75 LBS | HEART RATE: 71 BPM | DIASTOLIC BLOOD PRESSURE: 79 MMHG | BODY MASS INDEX: 28.79 KG/M2 | OXYGEN SATURATION: 97 % | SYSTOLIC BLOOD PRESSURE: 126 MMHG | TEMPERATURE: 99.3 F | RESPIRATION RATE: 18 BRPM

## 2020-06-15 DIAGNOSIS — C57.00 MALIGNANT NEOPLASM OF UNSPECIFIED FALLOPIAN TUBE: ICD-10-CM

## 2020-06-15 DIAGNOSIS — Z98.89 OTHER SPECIFIED POSTPROCEDURAL STATES: Chronic | ICD-10-CM

## 2020-06-15 DIAGNOSIS — Z90.710 ACQUIRED ABSENCE OF BOTH CERVIX AND UTERUS: Chronic | ICD-10-CM

## 2020-06-15 DIAGNOSIS — N75.0 CYST OF BARTHOLIN'S GLAND: Chronic | ICD-10-CM

## 2020-06-15 LAB
BASOPHILS # BLD AUTO: 0.04 K/UL — SIGNIFICANT CHANGE UP (ref 0–0.2)
BASOPHILS NFR BLD AUTO: 0.7 % — SIGNIFICANT CHANGE UP (ref 0–2)
EOSINOPHIL # BLD AUTO: 0.09 K/UL — SIGNIFICANT CHANGE UP (ref 0–0.5)
EOSINOPHIL NFR BLD AUTO: 1.6 % — SIGNIFICANT CHANGE UP (ref 0–6)
HCT VFR BLD CALC: 44.7 % — SIGNIFICANT CHANGE UP (ref 34.5–45)
HGB BLD-MCNC: 14.2 G/DL — SIGNIFICANT CHANGE UP (ref 11.5–15.5)
IMM GRANULOCYTES NFR BLD AUTO: 0.2 % — SIGNIFICANT CHANGE UP (ref 0–1.5)
LYMPHOCYTES # BLD AUTO: 1.67 K/UL — SIGNIFICANT CHANGE UP (ref 1–3.3)
LYMPHOCYTES # BLD AUTO: 29 % — SIGNIFICANT CHANGE UP (ref 13–44)
MCHC RBC-ENTMCNC: 30.9 PG — SIGNIFICANT CHANGE UP (ref 27–34)
MCHC RBC-ENTMCNC: 31.8 GM/DL — LOW (ref 32–36)
MCV RBC AUTO: 97.4 FL — SIGNIFICANT CHANGE UP (ref 80–100)
MONOCYTES # BLD AUTO: 0.67 K/UL — SIGNIFICANT CHANGE UP (ref 0–0.9)
MONOCYTES NFR BLD AUTO: 11.6 % — SIGNIFICANT CHANGE UP (ref 2–14)
NEUTROPHILS # BLD AUTO: 3.28 K/UL — SIGNIFICANT CHANGE UP (ref 1.8–7.4)
NEUTROPHILS NFR BLD AUTO: 56.9 % — SIGNIFICANT CHANGE UP (ref 43–77)
NRBC # BLD: 0 /100 WBCS — SIGNIFICANT CHANGE UP (ref 0–0)
PLATELET # BLD AUTO: 216 K/UL — SIGNIFICANT CHANGE UP (ref 150–400)
RBC # BLD: 4.59 M/UL — SIGNIFICANT CHANGE UP (ref 3.8–5.2)
RBC # FLD: 12.7 % — SIGNIFICANT CHANGE UP (ref 10.3–14.5)
WBC # BLD: 5.76 K/UL — SIGNIFICANT CHANGE UP (ref 3.8–10.5)
WBC # FLD AUTO: 5.76 K/UL — SIGNIFICANT CHANGE UP (ref 3.8–10.5)

## 2020-06-15 PROCEDURE — 82565 ASSAY OF CREATININE: CPT

## 2020-06-15 PROCEDURE — 74177 CT ABD & PELVIS W/CONTRAST: CPT

## 2020-06-15 PROCEDURE — 74177 CT ABD & PELVIS W/CONTRAST: CPT | Mod: 26

## 2020-06-15 PROCEDURE — 99213 OFFICE O/P EST LOW 20 MIN: CPT

## 2020-06-15 NOTE — REVIEW OF SYSTEMS
[Abdominal Pain] : abdominal pain [Vomiting] : no vomiting [Diarrhea] : no diarrhea [Constipation] : no constipation [Negative] : Heme/Lymph

## 2020-06-15 NOTE — ASSESSMENT
[FreeTextEntry1] : She is a 74 y/o F with STage II fallopian tube serous carcinoma who had exploratory laparotomy with ZAY/ BSO, omentectomy, and LN dissection on 8/11/15. She completed 6 cycles of adjuvant chemotherapy in 1/19/16. She has left groin pain x 2 weeks. She will try warm compress and rest. Will obtain  and will have interval CT to evaluate area. She will be 5 years out from her cancer and will see us for surveillance. Next follow up in 6 months but earlier if any new symptoms.\par

## 2020-06-15 NOTE — PHYSICAL EXAM
[Fully active, able to carry on all pre-disease performance without restriction] : Status 0 - Fully active, able to carry on all pre-disease performance without restriction [Normal] : affect appropriate [de-identified] : +BS, + tenderness on palpation LLQ; no pain elicited with raising leg or abduction/ adduction of the hip  [de-identified] : able to squat without hip pain; pain over the knees  [de-identified] : gait steady

## 2020-06-15 NOTE — HISTORY OF PRESENT ILLNESS
[Disease: _____________________] : Disease: [unfilled] [T: ___] : T[unfilled] [N: ___] : N[unfilled] [AJCC Stage: ____] : AJCC Stage: [unfilled] [de-identified] : She had evaluation for abdominal pain with CT of the abdomen/ pelvis on 7/20/15 which showed large cystic mass in the pelvis measuring 18.3 x 16.1 x 12 cm. There was no lymphadenopathy noted. On 8/11/15, she had exploratory laparotomy with ZAY/ BSO, omentectomy, and lymph node dissection. 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 She started her adjuvant carboplatin/ paclitaxel on 9/28/15. She had numbness and pain from the paclitaxel that persisted despite dose adjustment for cycle 2. We switched to gemcitabine due to persistent numbness and pain. Her treatment was delayed by 1 week after C3 with ANC still 0.9 K/microL 3 weeks after last treatment. Last treatment on 1/19/16. CT from 2/2016 and 5/2016 without any new evidence of disease.  [de-identified] : carbo/ taxol every 3 weeks from 9/28/15 to 10/2015\par carbo/ gem D 1 every 3 weeks from 10/2015 to 1/19/16\par Genetic testing: pt refused  [de-identified] : serous carcinoma [de-identified] : She had blood work done by PCP last month. She has been having left sided groin pain that is worse with standing x 2 weeks. Denies any exertion or falls at home. She denies any relation to food or any new bloating or vomiting. She has occasional nausea: did not see GI; she went to see her PCP and not taking the Pepcid. She denies any change to her energy. She is following covid precautions. She has someone drive her to [facility] for visits. She denies any new medications.

## 2020-06-15 NOTE — REASON FOR VISIT
[Follow-Up Visit] : a follow-up [FreeTextEntry2] : follow up for fallopian tube carcinoma and left abdominal pain x 2 weeks.

## 2020-06-16 LAB
ALBUMIN SERPL ELPH-MCNC: 4.6 G/DL
ALP BLD-CCNC: 66 U/L
ALT SERPL-CCNC: 14 U/L
ANION GAP SERPL CALC-SCNC: 13 MMOL/L
AST SERPL-CCNC: 16 U/L
BILIRUB SERPL-MCNC: 0.3 MG/DL
BUN SERPL-MCNC: 18 MG/DL
CALCIUM SERPL-MCNC: 10.1 MG/DL
CANCER AG125 SERPL-ACNC: 6 U/ML
CHLORIDE SERPL-SCNC: 101 MMOL/L
CO2 SERPL-SCNC: 30 MMOL/L
CREAT SERPL-MCNC: 1.15 MG/DL
GLUCOSE SERPL-MCNC: 114 MG/DL
POTASSIUM SERPL-SCNC: 5 MMOL/L
PROT SERPL-MCNC: 7.1 G/DL
SODIUM SERPL-SCNC: 143 MMOL/L

## 2020-07-01 ENCOUNTER — APPOINTMENT (OUTPATIENT)
Dept: GYNECOLOGIC ONCOLOGY | Facility: CLINIC | Age: 75
End: 2020-07-01
Payer: MEDICARE

## 2020-07-01 VITALS
BODY MASS INDEX: 29.01 KG/M2 | DIASTOLIC BLOOD PRESSURE: 77 MMHG | SYSTOLIC BLOOD PRESSURE: 131 MMHG | HEART RATE: 90 BPM | WEIGHT: 169 LBS

## 2020-07-01 PROCEDURE — 57100 BIOPSY VAGINAL MUCOSA SIMPLE: CPT

## 2020-07-01 PROCEDURE — 99213 OFFICE O/P EST LOW 20 MIN: CPT | Mod: 25

## 2020-07-01 NOTE — PHYSICAL EXAM
[Absent] : Adnexa(ae): Absent [Normal] : Recto-Vaginal Exam: Normal [Fully active, able to carry on all pre-disease performance without restriction] : Status 0 - Fully active, able to carry on all pre-disease performance without restriction [Abnormal] : Examination of vagina: Abnormal [de-identified] : well healed vertical midline incision [de-identified] : 1 cm slightly raised area at apex

## 2020-07-01 NOTE — REVIEW OF SYSTEMS
[Negative] : Musculoskeletal [Neuropathy] : neuropathy [Fatigue] : fatigue [Nausea] : nausea/vomitting

## 2020-07-01 NOTE — HISTORY OF PRESENT ILLNESS
[Disease: _____________________] : Disease: [unfilled] [T: ___] : T[unfilled] [N: ___] : N[unfilled] [AJCC Stage: ____] : AJCC Stage: [unfilled] [FreeTextEntry1] : Stage IIA fallopian tube cancer. \par ELAP, ZAY, BSO and staging 8/11/15.\par \par Received chemotherapy with Dr. Mortensen completed in January 2016.  2 cycles of Carbo and Taxol, then changed to Gemzar due to persistent neuropathy despite dose reduction. \par \par Complains of lower pelvic abdominal pain that has been present on and off since completion of chemotherapy. Denies bleeding. \par \par Imaging in October 2018 was GREG. \par  remains normal, 6\par Mammography October 2018 BIRADS 2. \par \par Never had genetic testing. Has been discussed and recommended but patient has declined. \par FH of breast cancer in a sister. \par \par 7/1/20: Returns for surveillance It has been over a year since her last visit. Recent CT scan with new lobular appearance to the vaginal cuff.  [de-identified] : serous carcinoma [de-identified] : carbo/ taxol every 3 weeks from 9/28/15 to 10/2015\par carbo/ gem D 1 every 3 weeks from 10/2015 to 1/19/16

## 2020-07-09 LAB — CORE LAB BIOPSY: NORMAL

## 2020-08-11 ENCOUNTER — APPOINTMENT (OUTPATIENT)
Dept: GYNECOLOGIC ONCOLOGY | Facility: CLINIC | Age: 75
End: 2020-08-11

## 2020-10-28 ENCOUNTER — OUTPATIENT (OUTPATIENT)
Dept: OUTPATIENT SERVICES | Facility: HOSPITAL | Age: 75
LOS: 1 days | Discharge: HOME | End: 2020-10-28

## 2020-10-28 DIAGNOSIS — C57.00 MALIGNANT NEOPLASM OF UNSPECIFIED FALLOPIAN TUBE: ICD-10-CM

## 2020-10-28 DIAGNOSIS — Z98.89 OTHER SPECIFIED POSTPROCEDURAL STATES: Chronic | ICD-10-CM

## 2020-10-28 DIAGNOSIS — N75.0 CYST OF BARTHOLIN'S GLAND: Chronic | ICD-10-CM

## 2020-10-28 DIAGNOSIS — Z90.710 ACQUIRED ABSENCE OF BOTH CERVIX AND UTERUS: Chronic | ICD-10-CM

## 2020-11-02 ENCOUNTER — RESULT REVIEW (OUTPATIENT)
Age: 75
End: 2020-11-02

## 2020-11-02 ENCOUNTER — APPOINTMENT (OUTPATIENT)
Dept: HEMATOLOGY ONCOLOGY | Facility: CLINIC | Age: 75
End: 2020-11-02
Payer: MEDICARE

## 2020-11-02 VITALS
RESPIRATION RATE: 17 BRPM | DIASTOLIC BLOOD PRESSURE: 82 MMHG | BODY MASS INDEX: 29.36 KG/M2 | HEIGHT: 63.98 IN | SYSTOLIC BLOOD PRESSURE: 141 MMHG | OXYGEN SATURATION: 98 % | HEART RATE: 74 BPM | TEMPERATURE: 97.2 F | WEIGHT: 171.96 LBS

## 2020-11-02 LAB
BASOPHILS # BLD AUTO: 0.02 K/UL — SIGNIFICANT CHANGE UP (ref 0–0.2)
BASOPHILS NFR BLD AUTO: 0.3 % — SIGNIFICANT CHANGE UP (ref 0–2)
EOSINOPHIL # BLD AUTO: 0.09 K/UL — SIGNIFICANT CHANGE UP (ref 0–0.5)
EOSINOPHIL NFR BLD AUTO: 1.5 % — SIGNIFICANT CHANGE UP (ref 0–6)
HCT VFR BLD CALC: 43.9 % — SIGNIFICANT CHANGE UP (ref 34.5–45)
HGB BLD-MCNC: 14.5 G/DL — SIGNIFICANT CHANGE UP (ref 11.5–15.5)
IMM GRANULOCYTES NFR BLD AUTO: 0.3 % — SIGNIFICANT CHANGE UP (ref 0–1.5)
LYMPHOCYTES # BLD AUTO: 1.85 K/UL — SIGNIFICANT CHANGE UP (ref 1–3.3)
LYMPHOCYTES # BLD AUTO: 30.3 % — SIGNIFICANT CHANGE UP (ref 13–44)
MCHC RBC-ENTMCNC: 31.7 PG — SIGNIFICANT CHANGE UP (ref 27–34)
MCHC RBC-ENTMCNC: 33 G/DL — SIGNIFICANT CHANGE UP (ref 32–36)
MCV RBC AUTO: 95.9 FL — SIGNIFICANT CHANGE UP (ref 80–100)
MONOCYTES # BLD AUTO: 0.56 K/UL — SIGNIFICANT CHANGE UP (ref 0–0.9)
MONOCYTES NFR BLD AUTO: 9.2 % — SIGNIFICANT CHANGE UP (ref 2–14)
NEUTROPHILS # BLD AUTO: 3.57 K/UL — SIGNIFICANT CHANGE UP (ref 1.8–7.4)
NEUTROPHILS NFR BLD AUTO: 58.4 % — SIGNIFICANT CHANGE UP (ref 43–77)
NRBC # BLD: 0 /100 WBCS — SIGNIFICANT CHANGE UP (ref 0–0)
PLATELET # BLD AUTO: 227 K/UL — SIGNIFICANT CHANGE UP (ref 150–400)
RBC # BLD: 4.58 M/UL — SIGNIFICANT CHANGE UP (ref 3.8–5.2)
RBC # FLD: 12.6 % — SIGNIFICANT CHANGE UP (ref 10.3–14.5)
WBC # BLD: 6.11 K/UL — SIGNIFICANT CHANGE UP (ref 3.8–10.5)
WBC # FLD AUTO: 6.11 K/UL — SIGNIFICANT CHANGE UP (ref 3.8–10.5)

## 2020-11-02 PROCEDURE — 99213 OFFICE O/P EST LOW 20 MIN: CPT

## 2020-11-02 RX ORDER — OLOPATADINE HCL 1 MG/ML
0.1 SOLUTION/ DROPS OPHTHALMIC
Qty: 20 | Refills: 0 | Status: DISCONTINUED | COMMUNITY
Start: 2019-08-08 | End: 2020-11-02

## 2020-11-03 LAB
ALBUMIN SERPL ELPH-MCNC: 4.6 G/DL
ALP BLD-CCNC: 63 U/L
ALT SERPL-CCNC: 19 U/L
ANION GAP SERPL CALC-SCNC: 13 MMOL/L
AST SERPL-CCNC: 22 U/L
BILIRUB SERPL-MCNC: 0.2 MG/DL
BUN SERPL-MCNC: 18 MG/DL
CALCIUM SERPL-MCNC: 9.9 MG/DL
CANCER AG125 SERPL-ACNC: 5 U/ML
CHLORIDE SERPL-SCNC: 103 MMOL/L
CO2 SERPL-SCNC: 27 MMOL/L
CREAT SERPL-MCNC: 1.15 MG/DL
GLUCOSE SERPL-MCNC: 152 MG/DL
POTASSIUM SERPL-SCNC: 4.6 MMOL/L
PROT SERPL-MCNC: 6.8 G/DL
SODIUM SERPL-SCNC: 142 MMOL/L

## 2020-11-03 NOTE — HISTORY OF PRESENT ILLNESS
[Disease: _____________________] : Disease: [unfilled] [T: ___] : T[unfilled] [N: ___] : N[unfilled] [AJCC Stage: ____] : AJCC Stage: [unfilled] [de-identified] : She had evaluation for abdominal pain with CT of the abdomen/ pelvis on 7/20/15 which showed large cystic mass in the pelvis measuring 18.3 x 16.1 x 12 cm. There was no lymphadenopathy noted. On 8/11/15, she had exploratory laparotomy with ZAY/ BSO, omentectomy, and lymph node dissection. 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 She started her adjuvant carboplatin/ paclitaxel on 9/28/15. She had numbness and pain from the paclitaxel that persisted despite dose adjustment for cycle 2. We switched to gemcitabine due to persistent numbness and pain. Her treatment was delayed by 1 week after C3 with ANC still 0.9 K/microL 3 weeks after last treatment. Last treatment on 1/19/16. CT from 2/2016 and 5/2016 without any new evidence of disease.  [de-identified] : serous carcinoma [de-identified] : carbo/ taxol every 3 weeks from 9/28/15 to 10/2015\par carbo/ gem D 1 every 3 weeks from 10/2015 to 1/19/16\par Genetic testing: pt refused  [de-identified] : She has occasional abdominal pain over the lower abdomen that comes and goes. Denies any worsening pain. Feels this pain is postsurgical. She denies any new medications. No new cough or vaginal spotting. She had her mammogram done in October and her flu shot.

## 2020-11-03 NOTE — PHYSICAL EXAM
[Fully active, able to carry on all pre-disease performance without restriction] : Status 0 - Fully active, able to carry on all pre-disease performance without restriction [Normal] : affect appropriate [de-identified] : +BS, + tenderness on palpation LLQ; no pain elicited with raising leg or abduction/ adduction of the hip  [de-identified] : gait steady

## 2020-11-03 NOTE — ASSESSMENT
[FreeTextEntry1] : She is a 76 y/o F with STage II fallopian tube serous carcinoma who had exploratory laparotomy with ZAY/ BSO, omentectomy, and LN dissection on 8/11/15. She completed 6 cycles of adjuvant chemotherapy in 1/19/16. She has remained on surveillance for 5 years. We reviewed interval imaging but if stable will follow clinically. We reviewed blood work:  and CMP today. Reviewed that disease unlikely to recur after 5 years and signs and symptoms of recurrence. If CT imaging negative, next follow up in 1 year or as needed.

## 2020-11-03 NOTE — REVIEW OF SYSTEMS
[Abdominal Pain] : abdominal pain [Negative] : Allergic/Immunologic [Vomiting] : no vomiting [Constipation] : no constipation [Diarrhea] : no diarrhea [FreeTextEntry7] : chronic

## 2020-11-06 ENCOUNTER — APPOINTMENT (OUTPATIENT)
Dept: CT IMAGING | Facility: IMAGING CENTER | Age: 75
End: 2020-11-06

## 2020-11-14 ENCOUNTER — APPOINTMENT (OUTPATIENT)
Dept: CT IMAGING | Facility: IMAGING CENTER | Age: 75
End: 2020-11-14
Payer: MEDICARE

## 2020-11-14 ENCOUNTER — OUTPATIENT (OUTPATIENT)
Dept: OUTPATIENT SERVICES | Facility: HOSPITAL | Age: 75
LOS: 1 days | End: 2020-11-14
Payer: MEDICARE

## 2020-11-14 DIAGNOSIS — Z98.89 OTHER SPECIFIED POSTPROCEDURAL STATES: Chronic | ICD-10-CM

## 2020-11-14 DIAGNOSIS — C57.00 MALIGNANT NEOPLASM OF UNSPECIFIED FALLOPIAN TUBE: ICD-10-CM

## 2020-11-14 DIAGNOSIS — N75.0 CYST OF BARTHOLIN'S GLAND: Chronic | ICD-10-CM

## 2020-11-14 DIAGNOSIS — Z90.710 ACQUIRED ABSENCE OF BOTH CERVIX AND UTERUS: Chronic | ICD-10-CM

## 2020-11-14 PROCEDURE — 74177 CT ABD & PELVIS W/CONTRAST: CPT

## 2020-11-14 PROCEDURE — 74177 CT ABD & PELVIS W/CONTRAST: CPT | Mod: 26

## 2021-11-08 ENCOUNTER — OUTPATIENT (OUTPATIENT)
Dept: OUTPATIENT SERVICES | Facility: HOSPITAL | Age: 76
LOS: 1 days | Discharge: HOME | End: 2021-11-08

## 2021-11-08 DIAGNOSIS — C57.00 MALIGNANT NEOPLASM OF UNSPECIFIED FALLOPIAN TUBE: ICD-10-CM

## 2021-11-08 DIAGNOSIS — Z90.710 ACQUIRED ABSENCE OF BOTH CERVIX AND UTERUS: Chronic | ICD-10-CM

## 2021-11-08 DIAGNOSIS — Z98.89 OTHER SPECIFIED POSTPROCEDURAL STATES: Chronic | ICD-10-CM

## 2021-11-08 DIAGNOSIS — N75.0 CYST OF BARTHOLIN'S GLAND: Chronic | ICD-10-CM

## 2021-11-10 ENCOUNTER — APPOINTMENT (OUTPATIENT)
Dept: HEMATOLOGY ONCOLOGY | Facility: CLINIC | Age: 76
End: 2021-11-10
Payer: MEDICARE

## 2021-11-10 ENCOUNTER — RESULT REVIEW (OUTPATIENT)
Age: 76
End: 2021-11-10

## 2021-11-10 ENCOUNTER — APPOINTMENT (OUTPATIENT)
Dept: CT IMAGING | Facility: IMAGING CENTER | Age: 76
End: 2021-11-10
Payer: MEDICARE

## 2021-11-10 ENCOUNTER — OUTPATIENT (OUTPATIENT)
Dept: OUTPATIENT SERVICES | Facility: HOSPITAL | Age: 76
LOS: 1 days | End: 2021-11-10
Payer: MEDICARE

## 2021-11-10 VITALS
HEIGHT: 63.98 IN | TEMPERATURE: 99 F | DIASTOLIC BLOOD PRESSURE: 76 MMHG | SYSTOLIC BLOOD PRESSURE: 124 MMHG | BODY MASS INDEX: 29.73 KG/M2 | RESPIRATION RATE: 16 BRPM | OXYGEN SATURATION: 97 % | WEIGHT: 174.17 LBS | HEART RATE: 16 BPM

## 2021-11-10 DIAGNOSIS — N75.0 CYST OF BARTHOLIN'S GLAND: Chronic | ICD-10-CM

## 2021-11-10 DIAGNOSIS — R10.30 LOWER ABDOMINAL PAIN, UNSPECIFIED: ICD-10-CM

## 2021-11-10 DIAGNOSIS — Z90.710 ACQUIRED ABSENCE OF BOTH CERVIX AND UTERUS: Chronic | ICD-10-CM

## 2021-11-10 DIAGNOSIS — Z98.89 OTHER SPECIFIED POSTPROCEDURAL STATES: Chronic | ICD-10-CM

## 2021-11-10 DIAGNOSIS — C57.00 MALIGNANT NEOPLASM OF UNSPECIFIED FALLOPIAN TUBE: ICD-10-CM

## 2021-11-10 PROCEDURE — 74177 CT ABD & PELVIS W/CONTRAST: CPT | Mod: 26,MG

## 2021-11-10 PROCEDURE — 99213 OFFICE O/P EST LOW 20 MIN: CPT

## 2021-11-10 PROCEDURE — 82565 ASSAY OF CREATININE: CPT

## 2021-11-10 PROCEDURE — G1004: CPT

## 2021-11-10 PROCEDURE — 74177 CT ABD & PELVIS W/CONTRAST: CPT | Mod: MG

## 2021-11-10 NOTE — HISTORY OF PRESENT ILLNESS
[Disease: _____________________] : Disease: [unfilled] [T: ___] : T[unfilled] [N: ___] : N[unfilled] [AJCC Stage: ____] : AJCC Stage: [unfilled] [de-identified] : She had evaluation for abdominal pain with CT of the abdomen/ pelvis on 7/20/15 which showed large cystic mass in the pelvis measuring 18.3 x 16.1 x 12 cm. There was no lymphadenopathy noted. On 8/11/15, she had exploratory laparotomy with ZAY/ BSO, omentectomy, and lymph node dissection. 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 She started her adjuvant carboplatin/ paclitaxel on 9/28/15. She had numbness and pain from the paclitaxel that persisted despite dose adjustment for cycle 2. We switched to gemcitabine due to persistent numbness and pain. Her treatment was delayed by 1 week after C3 with ANC still 0.9 K/microL 3 weeks after last treatment. Last treatment on 1/19/16. CT from 2/2016 and 5/2016 without any new evidence of disease.  [de-identified] : serous carcinoma [de-identified] : carbo/ taxol every 3 weeks from 9/28/15 to 10/2015\par carbo/ gem D 1 every 3 weeks from 10/2015 to 1/19/16\par Genetic testing: pt refused  [de-identified] : She continues to have intermittent abdominal pain every other day: worse where her ovaries used to be: resolves after few minutes. She denies any new cough or SOB. She has been having more hip pain: worse with weight bearing. She had blood work done on September:  remains WNL. No nausea or vomiting: notices when she eats certain foods she has gas.

## 2021-11-10 NOTE — REVIEW OF SYSTEMS
[Abdominal Pain] : abdominal pain [Vomiting] : no vomiting [Constipation] : no constipation [Diarrhea] : no diarrhea [Joint Stiffness] : no joint stiffness [Muscle Pain] : no muscle pain [Muscle Weakness] : no muscle weakness [Negative] : Allergic/Immunologic [FreeTextEntry7] : lower abdomen on and off

## 2021-11-10 NOTE — ASSESSMENT
[FreeTextEntry1] : She is a 74 y/o F with STage II fallopian tube serous carcinoma who had exploratory laparotomy with ZAY/ BSO, omentectomy, and LN dissection on 8/11/15. She completed 6 cycles of adjuvant chemotherapy in 1/19/16. She has remained on surveillance for 5 years. We reviewed interval imaging but if stable will follow clinically. We reviewed blood work from PCP done 9/2021 with  WNL. We reviewed most likely abdominal pain from past postsurgical scar tissue. She will continue with exercise as tolerated and foods with less bloating or gas symptoms. Next follow up in 1 year but earlier if any new symptoms.\par

## 2021-11-10 NOTE — ASSESSMENT
[FreeTextEntry1] : She is a 76 y/o F with STage II fallopian tube serous carcinoma who had exploratory laparotomy with ZAY/ BSO, omentectomy, and LN dissection on 8/11/15. She completed 6 cycles of adjuvant chemotherapy in 1/19/16. She has remained on surveillance for 5 years. We reviewed interval imaging but if stable will follow clinically. We reviewed blood work from PCP done 9/2021 with  WNL. We reviewed most likely abdominal pain from past postsurgical scar tissue. She will continue with exercise as tolerated and foods with less bloating or gas symptoms. Next follow up in 1 year but earlier if any new symptoms.\par

## 2021-11-10 NOTE — HISTORY OF PRESENT ILLNESS
[Disease: _____________________] : Disease: [unfilled] [T: ___] : T[unfilled] [N: ___] : N[unfilled] [AJCC Stage: ____] : AJCC Stage: [unfilled] [de-identified] : She had evaluation for abdominal pain with CT of the abdomen/ pelvis on 7/20/15 which showed large cystic mass in the pelvis measuring 18.3 x 16.1 x 12 cm. There was no lymphadenopathy noted. On 8/11/15, she had exploratory laparotomy with ZAY/ BSO, omentectomy, and lymph node dissection. 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 She started her adjuvant carboplatin/ paclitaxel on 9/28/15. She had numbness and pain from the paclitaxel that persisted despite dose adjustment for cycle 2. We switched to gemcitabine due to persistent numbness and pain. Her treatment was delayed by 1 week after C3 with ANC still 0.9 K/microL 3 weeks after last treatment. Last treatment on 1/19/16. CT from 2/2016 and 5/2016 without any new evidence of disease.  [de-identified] : serous carcinoma [de-identified] : carbo/ taxol every 3 weeks from 9/28/15 to 10/2015\par carbo/ gem D 1 every 3 weeks from 10/2015 to 1/19/16\par Genetic testing: pt refused  [de-identified] : She continues to have intermittent abdominal pain every other day: worse where her ovaries used to be: resolves after few minutes. She denies any new cough or SOB. She has been having more hip pain: worse with weight bearing. She had blood work done on September:  remains WNL. No nausea or vomiting: notices when she eats certain foods she has gas.

## 2021-11-10 NOTE — CONSULT LETTER
[Dear  ___] : Dear  [unfilled], [Courtesy Letter:] : I had the pleasure of seeing your patient, [unfilled], in my office today. [Please see my note below.] : Please see my note below. [Consult Closing:] : Thank you very much for allowing me to participate in the care of this patient.  If you have any questions, please do not hesitate to contact me. [Sincerely,] : Sincerely, [FreeTextEntry2] : Enzo Tubbs MD\par [id number]\[place] [address] [FreeTextEntry3] : Andrzej Mortensen MD\par Attending\par [facility]\par

## 2021-11-10 NOTE — PHYSICAL EXAM
[Fully active, able to carry on all pre-disease performance without restriction] : Status 0 - Fully active, able to carry on all pre-disease performance without restriction [Normal] : affect appropriate [de-identified] : +BS, + tenderness on palpation LLQ; no rebound or guarding [de-identified] : gait steady

## 2021-11-10 NOTE — CONSULT LETTER
[Dear  ___] : Dear  [unfilled], [Courtesy Letter:] : I had the pleasure of seeing your patient, [unfilled], in my office today. [Please see my note below.] : Please see my note below. [Consult Closing:] : Thank you very much for allowing me to participate in the care of this patient.  If you have any questions, please do not hesitate to contact me. [Sincerely,] : Sincerely, [FreeTextEntry2] : Enzo Tubbs MD\par [id number]\[place] [address] [FreeTextEntry3] : Andrzej Mortensen MD\par Attending\par [facility]\par

## 2021-11-10 NOTE — PHYSICAL EXAM
[Fully active, able to carry on all pre-disease performance without restriction] : Status 0 - Fully active, able to carry on all pre-disease performance without restriction [Normal] : affect appropriate [de-identified] : +BS, + tenderness on palpation LLQ; no rebound or guarding [de-identified] : gait steady

## 2022-11-26 ENCOUNTER — OUTPATIENT (OUTPATIENT)
Dept: OUTPATIENT SERVICES | Facility: HOSPITAL | Age: 77
LOS: 1 days | Discharge: HOME | End: 2022-11-26

## 2022-11-26 DIAGNOSIS — Z90.710 ACQUIRED ABSENCE OF BOTH CERVIX AND UTERUS: Chronic | ICD-10-CM

## 2022-11-26 DIAGNOSIS — Z98.89 OTHER SPECIFIED POSTPROCEDURAL STATES: Chronic | ICD-10-CM

## 2022-11-26 DIAGNOSIS — N75.0 CYST OF BARTHOLIN'S GLAND: Chronic | ICD-10-CM

## 2022-11-26 DIAGNOSIS — C57.00 MALIGNANT NEOPLASM OF UNSPECIFIED FALLOPIAN TUBE: ICD-10-CM

## 2022-11-30 ENCOUNTER — APPOINTMENT (OUTPATIENT)
Dept: HEMATOLOGY ONCOLOGY | Facility: CLINIC | Age: 77
End: 2022-11-30

## 2022-11-30 ENCOUNTER — OUTPATIENT (OUTPATIENT)
Dept: OUTPATIENT SERVICES | Facility: HOSPITAL | Age: 77
LOS: 1 days | End: 2022-11-30
Payer: MEDICARE

## 2022-11-30 ENCOUNTER — APPOINTMENT (OUTPATIENT)
Dept: CT IMAGING | Facility: IMAGING CENTER | Age: 77
End: 2022-11-30

## 2022-11-30 ENCOUNTER — RESULT REVIEW (OUTPATIENT)
Age: 77
End: 2022-11-30

## 2022-11-30 VITALS
BODY MASS INDEX: 29.52 KG/M2 | DIASTOLIC BLOOD PRESSURE: 72 MMHG | RESPIRATION RATE: 16 BRPM | OXYGEN SATURATION: 97 % | HEART RATE: 71 BPM | WEIGHT: 171.84 LBS | TEMPERATURE: 97 F | SYSTOLIC BLOOD PRESSURE: 118 MMHG

## 2022-11-30 DIAGNOSIS — Z98.89 OTHER SPECIFIED POSTPROCEDURAL STATES: Chronic | ICD-10-CM

## 2022-11-30 DIAGNOSIS — R10.30 LOWER ABDOMINAL PAIN, UNSPECIFIED: ICD-10-CM

## 2022-11-30 DIAGNOSIS — Z90.710 ACQUIRED ABSENCE OF BOTH CERVIX AND UTERUS: Chronic | ICD-10-CM

## 2022-11-30 DIAGNOSIS — N75.0 CYST OF BARTHOLIN'S GLAND: Chronic | ICD-10-CM

## 2022-11-30 DIAGNOSIS — C57.00 MALIGNANT NEOPLASM OF UNSPECIFIED FALLOPIAN TUBE: ICD-10-CM

## 2022-11-30 PROCEDURE — 99214 OFFICE O/P EST MOD 30 MIN: CPT

## 2022-11-30 PROCEDURE — 74177 CT ABD & PELVIS W/CONTRAST: CPT | Mod: 26,MH

## 2022-11-30 PROCEDURE — 74177 CT ABD & PELVIS W/CONTRAST: CPT

## 2022-11-30 NOTE — ASSESSMENT
[FreeTextEntry1] : She is a 76 y/o F with STage II fallopian tube serous carcinoma who had exploratory laparotomy with ZAY/ BSO, omentectomy, and LN dissection on 8/11/15. She completed 6 cycles of adjuvant chemotherapy in 1/19/16. She has remained on surveillance for 5 years. We reviewed her LLQ pain which may be chronic postsurgical changes. We reviewed signs and symptoms of worsening obstruction from adhesions. Will repeat CT imaging to rule out any additional changes. Questions answered to her satisfaction. She is agreeable with plan. Next follow up in 1 year but earlier if any new symptoms.\par

## 2022-11-30 NOTE — PHYSICAL EXAM
[Fully active, able to carry on all pre-disease performance without restriction] : Status 0 - Fully active, able to carry on all pre-disease performance without restriction [Normal] : affect appropriate [de-identified] : +BS, NT [de-identified] : gait steady

## 2022-11-30 NOTE — HISTORY OF PRESENT ILLNESS
[Disease: _____________________] : Disease: [unfilled] [T: ___] : T[unfilled] [N: ___] : N[unfilled] [AJCC Stage: ____] : AJCC Stage: [unfilled] [de-identified] : She had evaluation for abdominal pain with CT of the abdomen/ pelvis on 7/20/15 which showed large cystic mass in the pelvis measuring 18.3 x 16.1 x 12 cm. There was no lymphadenopathy noted. On 8/11/15, she had exploratory laparotomy with ZAY/ BSO, omentectomy, and lymph node dissection. 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 She started her adjuvant carboplatin/ paclitaxel on 9/28/15. She had numbness and pain from the paclitaxel that persisted despite dose adjustment for cycle 2. We switched to gemcitabine due to persistent numbness and pain. Her treatment was delayed by 1 week after C3 with ANC still 0.9 K/microL 3 weeks after last treatment. Last treatment on 1/19/16. CT from 2/2016 and 5/2016 without any new evidence of disease.  [de-identified] : serous carcinoma [de-identified] : carbo/ taxol every 3 weeks from 9/28/15 to 10/2015\par carbo/ gem D 1 every 3 weeks from 10/2015 to 1/19/16\par Genetic testing: pt refused  [de-identified] : She has been having intermittent LLQ pain: cramping sensation that comes and goes: no relation to food: wondering if gas. She denies any change to bowel habits. She had last colonoscopy 5 years ago. No nausea or vomiting. Does eat poppy seeds and foods with seeds: not sure if she knows if pain is worse after eating these foods.

## 2022-11-30 NOTE — REVIEW OF SYSTEMS
[Abdominal Pain] : abdominal pain [Negative] : Allergic/Immunologic [Vomiting] : no vomiting [Constipation] : no constipation [Diarrhea] : no diarrhea [FreeTextEntry7] : LLQ pain

## 2022-11-30 NOTE — REASON FOR VISIT
[Follow-Up Visit] : a follow-up [Spouse] : spouse [FreeTextEntry2] : follow up for fallopiant tube carcinoma with LLQ pain

## 2022-12-02 ENCOUNTER — NON-APPOINTMENT (OUTPATIENT)
Age: 77
End: 2022-12-02

## 2023-08-24 ENCOUNTER — OUTPATIENT (OUTPATIENT)
Dept: OUTPATIENT SERVICES | Facility: HOSPITAL | Age: 78
LOS: 1 days | Discharge: HOME | End: 2023-08-24

## 2023-08-24 DIAGNOSIS — Z98.89 OTHER SPECIFIED POSTPROCEDURAL STATES: Chronic | ICD-10-CM

## 2023-08-24 DIAGNOSIS — Z90.710 ACQUIRED ABSENCE OF BOTH CERVIX AND UTERUS: Chronic | ICD-10-CM

## 2023-08-24 DIAGNOSIS — C57.00 MALIGNANT NEOPLASM OF UNSPECIFIED FALLOPIAN TUBE: ICD-10-CM

## 2023-08-24 DIAGNOSIS — N75.0 CYST OF BARTHOLIN'S GLAND: Chronic | ICD-10-CM

## 2023-08-25 ENCOUNTER — RESULT REVIEW (OUTPATIENT)
Age: 78
End: 2023-08-25

## 2023-08-25 ENCOUNTER — APPOINTMENT (OUTPATIENT)
Dept: HEMATOLOGY ONCOLOGY | Facility: CLINIC | Age: 78
End: 2023-08-25
Payer: MEDICARE

## 2023-08-25 ENCOUNTER — OUTPATIENT (OUTPATIENT)
Dept: OUTPATIENT SERVICES | Facility: HOSPITAL | Age: 78
LOS: 1 days | End: 2023-08-25
Payer: MEDICARE

## 2023-08-25 ENCOUNTER — APPOINTMENT (OUTPATIENT)
Dept: CT IMAGING | Facility: IMAGING CENTER | Age: 78
End: 2023-08-25
Payer: MEDICARE

## 2023-08-25 VITALS
TEMPERATURE: 97.2 F | DIASTOLIC BLOOD PRESSURE: 82 MMHG | BODY MASS INDEX: 29.43 KG/M2 | HEART RATE: 80 BPM | OXYGEN SATURATION: 97 % | RESPIRATION RATE: 16 BRPM | SYSTOLIC BLOOD PRESSURE: 127 MMHG | WEIGHT: 171.3 LBS

## 2023-08-25 DIAGNOSIS — C57.00 MALIGNANT NEOPLASM OF UNSPECIFIED FALLOPIAN TUBE: ICD-10-CM

## 2023-08-25 DIAGNOSIS — N75.0 CYST OF BARTHOLIN'S GLAND: Chronic | ICD-10-CM

## 2023-08-25 DIAGNOSIS — Z98.89 OTHER SPECIFIED POSTPROCEDURAL STATES: Chronic | ICD-10-CM

## 2023-08-25 DIAGNOSIS — R19.04 LEFT LOWER QUADRANT ABDOMINAL SWELLING, MASS AND LUMP: ICD-10-CM

## 2023-08-25 LAB
BASOPHILS # BLD AUTO: 0.04 K/UL — SIGNIFICANT CHANGE UP (ref 0–0.2)
BASOPHILS NFR BLD AUTO: 0.6 % — SIGNIFICANT CHANGE UP (ref 0–2)
EOSINOPHIL # BLD AUTO: 0.15 K/UL — SIGNIFICANT CHANGE UP (ref 0–0.5)
EOSINOPHIL NFR BLD AUTO: 2.4 % — SIGNIFICANT CHANGE UP (ref 0–6)
HCT VFR BLD CALC: 44.3 % — SIGNIFICANT CHANGE UP (ref 34.5–45)
HGB BLD-MCNC: 14.8 G/DL — SIGNIFICANT CHANGE UP (ref 11.5–15.5)
IMM GRANULOCYTES NFR BLD AUTO: 0.3 % — SIGNIFICANT CHANGE UP (ref 0–0.9)
LYMPHOCYTES # BLD AUTO: 2.29 K/UL — SIGNIFICANT CHANGE UP (ref 1–3.3)
LYMPHOCYTES # BLD AUTO: 36.6 % — SIGNIFICANT CHANGE UP (ref 13–44)
MCHC RBC-ENTMCNC: 31.6 PG — SIGNIFICANT CHANGE UP (ref 27–34)
MCHC RBC-ENTMCNC: 33.4 G/DL — SIGNIFICANT CHANGE UP (ref 32–36)
MCV RBC AUTO: 94.5 FL — SIGNIFICANT CHANGE UP (ref 80–100)
MONOCYTES # BLD AUTO: 0.55 K/UL — SIGNIFICANT CHANGE UP (ref 0–0.9)
MONOCYTES NFR BLD AUTO: 8.8 % — SIGNIFICANT CHANGE UP (ref 2–14)
NEUTROPHILS # BLD AUTO: 3.21 K/UL — SIGNIFICANT CHANGE UP (ref 1.8–7.4)
NEUTROPHILS NFR BLD AUTO: 51.3 % — SIGNIFICANT CHANGE UP (ref 43–77)
NRBC # BLD: 0 /100 WBCS — SIGNIFICANT CHANGE UP (ref 0–0)
PLATELET # BLD AUTO: 216 K/UL — SIGNIFICANT CHANGE UP (ref 150–400)
RBC # BLD: 4.69 M/UL — SIGNIFICANT CHANGE UP (ref 3.8–5.2)
RBC # FLD: 12.4 % — SIGNIFICANT CHANGE UP (ref 10.3–14.5)
WBC # BLD: 6.26 K/UL — SIGNIFICANT CHANGE UP (ref 3.8–10.5)
WBC # FLD AUTO: 6.26 K/UL — SIGNIFICANT CHANGE UP (ref 3.8–10.5)

## 2023-08-25 PROCEDURE — 99214 OFFICE O/P EST MOD 30 MIN: CPT

## 2023-08-25 PROCEDURE — 74177 CT ABD & PELVIS W/CONTRAST: CPT | Mod: 26,MH

## 2023-08-25 PROCEDURE — 74177 CT ABD & PELVIS W/CONTRAST: CPT

## 2023-08-25 NOTE — ASSESSMENT
[FreeTextEntry1] : She is a 77 y/o F with Stage II fallopian tube serous carcinoma who had exploratory laparotomy with ZAY/ BSO, omentectomy, and LN dissection on 8/11/15. She completed 6 cycles of adjuvant chemotherapy in 1/19/16. She has remained on surveillance for 5 years. We reviewed health maintenance usual screening for colonoscopy and breast imaging. We explained that while she may develop a new cancer; usual screening will be able to detect changes without added radiation exposure of CT scans. We reviewed her concerns: will obtain interval CT. She will continue with low fat diet and exercise and can see us as needed. Questions answered to her satisfaction. She is agreeable with plan.

## 2023-08-25 NOTE — REVIEW OF SYSTEMS
[Cough] : cough [Abdominal Pain] : abdominal pain [Diarrhea: Grade 0] : Diarrhea: Grade 0 [Negative] : Allergic/Immunologic [Shortness Of Breath] : no shortness of breath [Wheezing] : no wheezing [SOB on Exertion] : no shortness of breath during exertion [Vomiting] : no vomiting [Constipation] : no constipation [FreeTextEntry6] : dry cough occasional

## 2023-08-25 NOTE — PHYSICAL EXAM
[Fully active, able to carry on all pre-disease performance without restriction] : Status 0 - Fully active, able to carry on all pre-disease performance without restriction [Normal] : affect appropriate [de-identified] : gait steady

## 2023-08-25 NOTE — CONSULT LETTER
[Dear  ___] : Dear  [unfilled], [Courtesy Letter:] : I had the pleasure of seeing your patient, [unfilled], in my office today. [Please see my note below.] : Please see my note below. [Consult Closing:] : Thank you very much for allowing me to participate in the care of this patient.  If you have any questions, please do not hesitate to contact me. [Sincerely,] : Sincerely, [FreeTextEntry3] : Andrzej Mortensen MD Attending [facility] [FreeTextEntry2] : Enzo Tubbs -[address] [facility], [address] [place] [id number]

## 2023-08-25 NOTE — HISTORY OF PRESENT ILLNESS
[Disease: _____________________] : Disease: [unfilled] [T: ___] : T[unfilled] [N: ___] : N[unfilled] [AJCC Stage: ____] : AJCC Stage: [unfilled] [de-identified] : She had evaluation for abdominal pain with CT of the abdomen/ pelvis on 7/20/15 which showed large cystic mass in the pelvis measuring 18.3 x 16.1 x 12 cm. There was no lymphadenopathy noted. On 8/11/15, she had exploratory laparotomy with ZAY/ BSO, omentectomy, and lymph node dissection. 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 She started her adjuvant carboplatin/ paclitaxel on 9/28/15. She had numbness and pain from the paclitaxel that persisted despite dose adjustment for cycle 2. We switched to gemcitabine due to persistent numbness and pain. Her treatment was delayed by 1 week after C3 with ANC still 0.9 K/microL 3 weeks after last treatment. Last treatment on 1/19/16. CT from 2/2016 and 5/2016 without any new evidence of disease.  [de-identified] : serous carcinoma [de-identified] : carbo/ taxol every 3 weeks from 9/28/15 to 10/2015\par  carbo/ gem D 1 every 3 weeks from 10/2015 to 1/19/16\par  Genetic testing: pt refused  [de-identified] : She has occasional abdominal pain: feels the R side next to belly button feels more prominent. She is worried about her : he was OK in spring and then had recently diagnosed with anemia and found to have large colonic mass 6.7 x 2.5 cm in the cecum with adenopathy. He will be getting colonoscopy (last one 5 years ago was WNL). She is nervous she will have the same issue. She has not had recent blood work. No blood in stool or pain after eating. She has occasional dry cough. No new medications. Has new PCP: Dr Tubbs.

## 2023-08-28 LAB
ALBUMIN SERPL ELPH-MCNC: 4.7 G/DL
ALP BLD-CCNC: 64 U/L
ALT SERPL-CCNC: 27 U/L
ANION GAP SERPL CALC-SCNC: 16 MMOL/L
AST SERPL-CCNC: 28 U/L
BILIRUB SERPL-MCNC: 0.3 MG/DL
BUN SERPL-MCNC: 17 MG/DL
CALCIUM SERPL-MCNC: 10.6 MG/DL
CANCER AG125 SERPL-ACNC: 5 U/ML
CHLORIDE SERPL-SCNC: 100 MMOL/L
CO2 SERPL-SCNC: 25 MMOL/L
CREAT SERPL-MCNC: 1.04 MG/DL
EGFR: 55 ML/MIN/1.73M2
GLUCOSE SERPL-MCNC: 235 MG/DL
POTASSIUM SERPL-SCNC: 4.7 MMOL/L
PROT SERPL-MCNC: 7.3 G/DL
SODIUM SERPL-SCNC: 141 MMOL/L

## 2023-08-31 ENCOUNTER — NON-APPOINTMENT (OUTPATIENT)
Age: 78
End: 2023-08-31

## 2024-07-14 PROBLEM — E11.9 TYPE 2 DIABETES MELLITUS WITHOUT COMPLICATION, WITHOUT LONG-TERM CURRENT USE OF INSULIN: Status: ACTIVE | Noted: 2024-07-14

## 2024-08-19 ENCOUNTER — OUTPATIENT (OUTPATIENT)
Dept: OUTPATIENT SERVICES | Facility: HOSPITAL | Age: 79
LOS: 1 days | Discharge: HOME | End: 2024-08-19

## 2024-08-19 DIAGNOSIS — Z98.89 OTHER SPECIFIED POSTPROCEDURAL STATES: Chronic | ICD-10-CM

## 2024-08-19 DIAGNOSIS — Z90.710 ACQUIRED ABSENCE OF BOTH CERVIX AND UTERUS: Chronic | ICD-10-CM

## 2024-08-19 DIAGNOSIS — C57.00 MALIGNANT NEOPLASM OF UNSPECIFIED FALLOPIAN TUBE: ICD-10-CM

## 2024-08-19 DIAGNOSIS — N75.0 CYST OF BARTHOLIN'S GLAND: Chronic | ICD-10-CM

## 2024-08-28 ENCOUNTER — APPOINTMENT (OUTPATIENT)
Dept: HEMATOLOGY ONCOLOGY | Facility: CLINIC | Age: 79
End: 2024-08-28

## 2024-08-29 ENCOUNTER — NON-APPOINTMENT (OUTPATIENT)
Age: 79
End: 2024-08-29

## 2024-08-30 ENCOUNTER — RESULT REVIEW (OUTPATIENT)
Age: 79
End: 2024-08-30

## 2024-08-30 ENCOUNTER — APPOINTMENT (OUTPATIENT)
Dept: HEMATOLOGY ONCOLOGY | Facility: CLINIC | Age: 79
End: 2024-08-30
Payer: MEDICARE

## 2024-08-30 ENCOUNTER — OUTPATIENT (OUTPATIENT)
Dept: OUTPATIENT SERVICES | Facility: HOSPITAL | Age: 79
LOS: 1 days | End: 2024-08-30
Payer: MEDICARE

## 2024-08-30 ENCOUNTER — APPOINTMENT (OUTPATIENT)
Dept: CT IMAGING | Facility: IMAGING CENTER | Age: 79
End: 2024-08-30

## 2024-08-30 VITALS
TEMPERATURE: 97 F | RESPIRATION RATE: 16 BRPM | DIASTOLIC BLOOD PRESSURE: 79 MMHG | OXYGEN SATURATION: 97 % | WEIGHT: 163.14 LBS | HEART RATE: 89 BPM | BODY MASS INDEX: 28.02 KG/M2 | SYSTOLIC BLOOD PRESSURE: 127 MMHG

## 2024-08-30 DIAGNOSIS — C57.00 MALIGNANT NEOPLASM OF UNSPECIFIED FALLOPIAN TUBE: ICD-10-CM

## 2024-08-30 DIAGNOSIS — Z98.89 OTHER SPECIFIED POSTPROCEDURAL STATES: Chronic | ICD-10-CM

## 2024-08-30 DIAGNOSIS — R10.30 LOWER ABDOMINAL PAIN, UNSPECIFIED: ICD-10-CM

## 2024-08-30 DIAGNOSIS — N75.0 CYST OF BARTHOLIN'S GLAND: Chronic | ICD-10-CM

## 2024-08-30 LAB
BASOPHILS # BLD AUTO: 0.05 K/UL — SIGNIFICANT CHANGE UP (ref 0–0.2)
BASOPHILS NFR BLD AUTO: 0.7 % — SIGNIFICANT CHANGE UP (ref 0–2)
EOSINOPHIL # BLD AUTO: 0.23 K/UL — SIGNIFICANT CHANGE UP (ref 0–0.5)
EOSINOPHIL NFR BLD AUTO: 3.3 % — SIGNIFICANT CHANGE UP (ref 0–6)
HCT VFR BLD CALC: 44.4 % — SIGNIFICANT CHANGE UP (ref 34.5–45)
HGB BLD-MCNC: 14.8 G/DL — SIGNIFICANT CHANGE UP (ref 11.5–15.5)
IMM GRANULOCYTES NFR BLD AUTO: 0.4 % — SIGNIFICANT CHANGE UP (ref 0–0.9)
LYMPHOCYTES # BLD AUTO: 1.66 K/UL — SIGNIFICANT CHANGE UP (ref 1–3.3)
LYMPHOCYTES # BLD AUTO: 23.9 % — SIGNIFICANT CHANGE UP (ref 13–44)
MCHC RBC-ENTMCNC: 31.7 PG — SIGNIFICANT CHANGE UP (ref 27–34)
MCHC RBC-ENTMCNC: 33.3 G/DL — SIGNIFICANT CHANGE UP (ref 32–36)
MCV RBC AUTO: 95.1 FL — SIGNIFICANT CHANGE UP (ref 80–100)
MONOCYTES # BLD AUTO: 0.79 K/UL — SIGNIFICANT CHANGE UP (ref 0–0.9)
MONOCYTES NFR BLD AUTO: 11.4 % — SIGNIFICANT CHANGE UP (ref 2–14)
NEUTROPHILS # BLD AUTO: 4.18 K/UL — SIGNIFICANT CHANGE UP (ref 1.8–7.4)
NEUTROPHILS NFR BLD AUTO: 60.3 % — SIGNIFICANT CHANGE UP (ref 43–77)
NRBC # BLD: 0 /100 WBCS — SIGNIFICANT CHANGE UP (ref 0–0)
NRBC BLD-RTO: 0 /100 WBCS — SIGNIFICANT CHANGE UP (ref 0–0)
PLATELET # BLD AUTO: 287 K/UL — SIGNIFICANT CHANGE UP (ref 150–400)
RBC # BLD: 4.67 M/UL — SIGNIFICANT CHANGE UP (ref 3.8–5.2)
RBC # FLD: 12.7 % — SIGNIFICANT CHANGE UP (ref 10.3–14.5)
WBC # BLD: 6.94 K/UL — SIGNIFICANT CHANGE UP (ref 3.8–10.5)
WBC # FLD AUTO: 6.94 K/UL — SIGNIFICANT CHANGE UP (ref 3.8–10.5)

## 2024-08-30 PROCEDURE — 74176 CT ABD & PELVIS W/O CONTRAST: CPT | Mod: 26,MH

## 2024-08-30 PROCEDURE — G2211 COMPLEX E/M VISIT ADD ON: CPT

## 2024-08-30 PROCEDURE — 99214 OFFICE O/P EST MOD 30 MIN: CPT

## 2024-08-30 PROCEDURE — 74176 CT ABD & PELVIS W/O CONTRAST: CPT

## 2024-08-30 NOTE — CONSULT LETTER
[Dear  ___] : Dear  [unfilled], [Courtesy Letter:] : I had the pleasure of seeing your patient, [unfilled], in my office today. [Please see my note below.] : Please see my note below. [Consult Closing:] : Thank you very much for allowing me to participate in the care of this patient.  If you have any questions, please do not hesitate to contact me. [Sincerely,] : Sincerely, [FreeTextEntry2] : Enzo Tubbs -[address] [facility], [address],  [place] [id number] [FreeTextEntry3] : Andrzej Mortensen MD Attending [facility]

## 2024-08-30 NOTE — PHYSICAL EXAM
[Fully active, able to carry on all pre-disease performance without restriction] : Status 0 - Fully active, able to carry on all pre-disease performance without restriction [Normal] : affect appropriate [de-identified] : gait steady

## 2024-08-30 NOTE — PHYSICAL EXAM
[Fully active, able to carry on all pre-disease performance without restriction] : Status 0 - Fully active, able to carry on all pre-disease performance without restriction [Normal] : affect appropriate [de-identified] : gait steady

## 2024-08-30 NOTE — REVIEW OF SYSTEMS
[Abdominal Pain] : abdominal pain [Vomiting] : no vomiting [Constipation] : no constipation [Diarrhea: Grade 0] : Diarrhea: Grade 0 [Negative] : Allergic/Immunologic [FreeTextEntry7] : bilateral lower abdominal pain

## 2024-08-30 NOTE — BEGINNING OF VISIT
[0] : 2) Feeling down, depressed, or hopeless: Not at all (0) [PHQ-2 Negative] : PHQ-2 Negative [Pain Scale: ___] : On a scale of 1-10, today the patient's pain is a(n) [unfilled]. [Never] : Never [Date Discussed (MM/DD/YY): ___] : Discussed: [unfilled] [With Patient/Caregiver] : with Patient/Caregiver

## 2024-08-30 NOTE — CONSULT LETTER
[Dear  ___] : Dear  [unfilled], [Courtesy Letter:] : I had the pleasure of seeing your patient, [unfilled], in my office today. [Please see my note below.] : Please see my note below. [Consult Closing:] : Thank you very much for allowing me to participate in the care of this patient.  If you have any questions, please do not hesitate to contact me. [Sincerely,] : Sincerely, [FreeTextEntry2] : Enzo Tubbs -[address] [facility], [place], [address],  [place] [id number] [FreeTextEntry3] : Andrzej Mortensen MD Attending [facility]

## 2024-08-30 NOTE — HISTORY OF PRESENT ILLNESS
[Disease: _____________________] : Disease: [unfilled] [T: ___] : T[unfilled] [N: ___] : N[unfilled] [AJCC Stage: ____] : AJCC Stage: [unfilled] [de-identified] : She had evaluation for abdominal pain with CT of the abdomen/ pelvis on 7/20/15 which showed large cystic mass in the pelvis measuring 18.3 x 16.1 x 12 cm. There was no lymphadenopathy noted. On 8/11/15, she had exploratory laparotomy with ZAY/ BSO, omentectomy, and lymph node dissection. 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 She started her adjuvant carboplatin/ paclitaxel on 9/28/15. She had numbness and pain from the paclitaxel that persisted despite dose adjustment for cycle 2. We switched to gemcitabine due to persistent numbness and pain. Her treatment was delayed by 1 week after C3 with ANC still 0.9 K/microL 3 weeks after last treatment. Last treatment on 1/19/16. CT from 2/2016 and 5/2016 without any new evidence of disease.  [de-identified] : serous carcinoma [de-identified] : carbo/ taxol every 3 weeks from 9/28/15 to 10/2015\par  carbo/ gem D 1 every 3 weeks from 10/2015 to 1/19/16\par  Genetic testing: pt refused  [de-identified] : She has been having intermittent pain over the B lower abdominal pain near the surgical site. Also has been losing weight: lost 5 lbs. Has been taking new medications Janumet for sugar and fenofibrate. She was started recently for diabetes. She denies any stool changes or blood in stool. She denies any new cough or back pain. She will be getting mammogram next month.

## 2024-08-30 NOTE — ASSESSMENT
[FreeTextEntry1] : She is a 78 y/o F with Stage II fallopian tube serous carcinoma who had exploratory laparotomy with ZAY/ BSO, omentectomy, and LN dissection on 8/11/15. She completed 6 cycles of adjuvant chemotherapy in 1/19/16. She has remained on surveillance: past 9 years: reviewed low likelihood of recurrence at this time. We reviewed low fat diet and exercise. We reviewed maintenance health care: mammogram: due next month. She will have interval CT imaging to evaluate abdominal pain: we reviewed post surgical pain and supportive measures for hernia and pain. Questions answered to her satisfaction. She is agreeable with plan. She will follow up with us as needed.

## 2024-08-30 NOTE — ASSESSMENT
[FreeTextEntry1] : She is a 80 y/o F with Stage II fallopian tube serous carcinoma who had exploratory laparotomy with ZAY/ BSO, omentectomy, and LN dissection on 8/11/15. She completed 6 cycles of adjuvant chemotherapy in 1/19/16. She has remained on surveillance: past 9 years: reviewed low likelihood of recurrence at this time. We reviewed low fat diet and exercise. We reviewed maintenance health care: mammogram: due next month. She will have interval CT imaging to evaluate abdominal pain: we reviewed post surgical pain and supportive measures for hernia and pain. Questions answered to her satisfaction. She is agreeable with plan. She will follow up with us as needed.

## 2024-08-30 NOTE — HISTORY OF PRESENT ILLNESS
[Disease: _____________________] : Disease: [unfilled] [T: ___] : T[unfilled] [N: ___] : N[unfilled] [AJCC Stage: ____] : AJCC Stage: [unfilled] [de-identified] : She had evaluation for abdominal pain with CT of the abdomen/ pelvis on 7/20/15 which showed large cystic mass in the pelvis measuring 18.3 x 16.1 x 12 cm. There was no lymphadenopathy noted. On 8/11/15, she had exploratory laparotomy with ZAY/ BSO, omentectomy, and lymph node dissection. 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 She started her adjuvant carboplatin/ paclitaxel on 9/28/15. She had numbness and pain from the paclitaxel that persisted despite dose adjustment for cycle 2. We switched to gemcitabine due to persistent numbness and pain. Her treatment was delayed by 1 week after C3 with ANC still 0.9 K/microL 3 weeks after last treatment. Last treatment on 1/19/16. CT from 2/2016 and 5/2016 without any new evidence of disease.  [de-identified] : serous carcinoma [de-identified] : carbo/ taxol every 3 weeks from 9/28/15 to 10/2015\par  carbo/ gem D 1 every 3 weeks from 10/2015 to 1/19/16\par  Genetic testing: pt refused  [de-identified] : She has been having intermittent pain over the B lower abdominal pain near the surgical site. Also has been losing weight: lost 5 lbs. Has been taking new medications Janumet for sugar and fenofibrate. She was started recently for diabetes. She denies any stool changes or blood in stool. She denies any new cough or back pain. She will be getting mammogram next month.

## 2024-09-03 LAB
ALBUMIN SERPL ELPH-MCNC: 4.9 G/DL
ALP BLD-CCNC: 36 U/L
ALT SERPL-CCNC: 23 U/L
ANION GAP SERPL CALC-SCNC: 14 MMOL/L
AST SERPL-CCNC: 31 U/L
BILIRUB SERPL-MCNC: 0.4 MG/DL
BUN SERPL-MCNC: 25 MG/DL
CALCIUM SERPL-MCNC: 10.9 MG/DL
CANCER AG125 SERPL-ACNC: 4 U/ML
CHLORIDE SERPL-SCNC: 103 MMOL/L
CO2 SERPL-SCNC: 23 MMOL/L
CREAT SERPL-MCNC: 1.39 MG/DL
EGFR: 39 ML/MIN/1.73M2
GLUCOSE SERPL-MCNC: 107 MG/DL
POTASSIUM SERPL-SCNC: 4.6 MMOL/L
PROT SERPL-MCNC: 7.6 G/DL
SODIUM SERPL-SCNC: 141 MMOL/L

## 2024-09-05 ENCOUNTER — NON-APPOINTMENT (OUTPATIENT)
Age: 79
End: 2024-09-05

## 2025-08-29 ENCOUNTER — APPOINTMENT (OUTPATIENT)
Dept: HEMATOLOGY ONCOLOGY | Facility: CLINIC | Age: 80
End: 2025-08-29